# Patient Record
Sex: MALE | Race: WHITE | NOT HISPANIC OR LATINO
[De-identification: names, ages, dates, MRNs, and addresses within clinical notes are randomized per-mention and may not be internally consistent; named-entity substitution may affect disease eponyms.]

---

## 2022-11-10 ENCOUNTER — HOSPITAL ENCOUNTER (OUTPATIENT)
Dept: DATA CONVERSION | Age: 32
Discharge: NURSING, REHAB OR LONG-TERM CARE | End: 2022-11-10
Attending: SURGERY | Admitting: SURGERY

## 2022-11-10 LAB
ABO GROUP (TYPE) IN BLOOD: NORMAL
ANTIBODY SCREEN: NORMAL
ERYTHROCYTE DISTRIBUTION WIDTH (RATIO) BY AUTOMATED COUNT: 12.3 % (ref 11.5–14.5)
ERYTHROCYTE MEAN CORPUSCULAR HEMOGLOBIN CONCENTRATION (G/DL) BY AUTOMATED: 34.4 G/DL (ref 32–36)
ERYTHROCYTE MEAN CORPUSCULAR VOLUME (FL) BY AUTOMATED COUNT: 85 FL (ref 80–100)
ERYTHROCYTES (10*6/UL) IN BLOOD BY AUTOMATED COUNT: 4.35 X10E12/L (ref 4.5–5.9)
ETHANOL (MG/DL) IN SER/PLAS: <10 MG/DL
HEMATOCRIT (%) IN BLOOD BY AUTOMATED COUNT: 36.9 % (ref 41–52)
HEMOGLOBIN (G/DL) IN BLOOD: 12.7 G/DL (ref 13.5–17.5)
LEUKOCYTES (10*3/UL) IN BLOOD BY AUTOMATED COUNT: 7.7 X10E9/L (ref 4.4–11.3)
MAGNESIUM (MG/DL) IN SER/PLAS: 1.83 MG/DL (ref 1.6–2.4)
NRBC (PER 100 WBCS) BY AUTOMATED COUNT: 0 /100 WBC (ref 0–0)
PATIENT TEMPERATURE: 37 DEGREES C
PATIENT TEMPERATURE: 37 DEGREES C
PHOSPHATE (MG/DL) IN SER/PLAS: 2.3 MG/DL (ref 2.5–4.9)
PLATELETS (10*3/UL) IN BLOOD AUTOMATED COUNT: 386 X10E9/L (ref 150–450)
POCT ANION GAP, VENOUS: 11 MMOL/L (ref 10–25)
POCT ANION GAP, VENOUS: 6 MMOL/L (ref 10–25)
POCT BASE EXCESS, VENOUS: -1.1 MMOL/L (ref -2–3)
POCT BASE EXCESS, VENOUS: 1.2 MMOL/L (ref -2–3)
POCT CHLORIDE, VENOUS: 106 MMOL/L (ref 98–107)
POCT CHLORIDE, VENOUS: 107 MMOL/L (ref 98–107)
POCT GLUCOSE, VENOUS: 113 MG/DL (ref 74–99)
POCT GLUCOSE, VENOUS: 87 MG/DL (ref 74–99)
POCT HCO3, VENOUS: 26 MMOL/L (ref 22–26)
POCT HCO3, VENOUS: 26.1 MMOL/L (ref 22–26)
POCT HEMATOCRIT, VENOUS: 39 % (ref 41–52)
POCT HEMATOCRIT, VENOUS: 41 % (ref 41–52)
POCT HEMOGLOBIN, VENOUS: 13 G/DL (ref 13.5–17.5)
POCT HEMOGLOBIN, VENOUS: 13.6 G/DL (ref 13.5–17.5)
POCT IONIZED CALCIUM, VENOUS: 1.21 MMOL/L (ref 1.1–1.33)
POCT IONIZED CALCIUM, VENOUS: 1.24 MMOL/L (ref 1.1–1.33)
POCT LACTATE, VENOUS: 0.9 MMOL/L (ref 0.4–2)
POCT LACTATE, VENOUS: 1.3 MMOL/L (ref 0.4–2)
POCT OXY HEMOGLOBIN, VENOUS: 66.9 % (ref 45–75)
POCT OXY HEMOGLOBIN, VENOUS: 86.1 % (ref 45–75)
POCT PCO2, VENOUS: 41 MMHG (ref 41–51)
POCT PCO2, VENOUS: 53 MMHG (ref 41–51)
POCT PH, VENOUS: 7.3 (ref 7.33–7.43)
POCT PH, VENOUS: 7.41 (ref 7.33–7.43)
POCT PO2, VENOUS: 38 MMHG (ref 35–45)
POCT PO2, VENOUS: 60 MMHG (ref 35–45)
POCT POTASSIUM, VENOUS: 3.7 MMOL/L (ref 3.5–5.3)
POCT POTASSIUM, VENOUS: 3.9 MMOL/L (ref 3.5–5.3)
POCT SO2, VENOUS: 69 % (ref 45–75)
POCT SO2, VENOUS: 89 % (ref 45–75)
POCT SODIUM, VENOUS: 135 MMOL/L (ref 136–145)
POCT SODIUM, VENOUS: 139 MMOL/L (ref 136–145)
RH FACTOR: NORMAL
SARS-COV-2 RESULT: NOT DETECTED

## 2022-11-11 DIAGNOSIS — R11.2 NAUSEA WITH VOMITING, UNSPECIFIED: ICD-10-CM

## 2022-11-11 DIAGNOSIS — S52.502A UNSPECIFIED FRACTURE OF THE LOWER END OF LEFT RADIUS, INITIAL ENCOUNTER FOR CLOSED FRACTURE: ICD-10-CM

## 2022-11-11 DIAGNOSIS — S82.899A OTHER FRACTURE OF UNSPECIFIED LOWER LEG, INITIAL ENCOUNTER FOR CLOSED FRACTURE: ICD-10-CM
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
## 2022-11-11 DIAGNOSIS — Z20.822 CONTACT WITH AND (SUSPECTED) EXPOSURE TO COVID-19: ICD-10-CM

## 2022-11-11 DIAGNOSIS — T07.XXXA UNSPECIFIED MULTIPLE INJURIES, INITIAL ENCOUNTER: ICD-10-CM

## 2022-11-11 DIAGNOSIS — S93.04XA DISLOCATION OF RIGHT ANKLE JOINT, INITIAL ENCOUNTER: ICD-10-CM

## 2022-11-11 DIAGNOSIS — M79.604 PAIN IN RIGHT LEG: ICD-10-CM

## 2022-11-11 DIAGNOSIS — Z88.0 ALLERGY STATUS TO PENICILLIN: ICD-10-CM

## 2022-11-11 DIAGNOSIS — S82.832A OTHER FRACTURE OF UPPER AND LOWER END OF LEFT FIBULA, INITIAL ENCOUNTER FOR CLOSED FRACTURE: ICD-10-CM

## 2022-11-11 DIAGNOSIS — Z04.89 ENCOUNTER FOR EXAMINATION AND OBSERVATION FOR OTHER SPECIFIED REASONS: ICD-10-CM

## 2022-11-11 DIAGNOSIS — Z47.89 ENCOUNTER FOR OTHER ORTHOPEDIC AFTERCARE: ICD-10-CM

## 2022-11-11 DIAGNOSIS — S92.121A DISPLACED FRACTURE OF BODY OF RIGHT TALUS, INITIAL ENCOUNTER FOR CLOSED FRACTURE: ICD-10-CM
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
## 2022-11-11 DIAGNOSIS — R68.89 OTHER GENERAL SYMPTOMS AND SIGNS: ICD-10-CM

## 2022-11-11 DIAGNOSIS — G89.11 ACUTE PAIN DUE TO TRAUMA: ICD-10-CM

## 2022-11-11 DIAGNOSIS — F17.200 NICOTINE DEPENDENCE, UNSPECIFIED, UNCOMPLICATED: ICD-10-CM

## 2022-11-11 DIAGNOSIS — E66.9 OBESITY, UNSPECIFIED: ICD-10-CM

## 2022-11-11 DIAGNOSIS — Z79.899 OTHER LONG TERM (CURRENT) DRUG THERAPY: ICD-10-CM

## 2022-11-11 DIAGNOSIS — M79.602 PAIN IN LEFT ARM: ICD-10-CM

## 2022-11-11 DIAGNOSIS — S92.142A DISPLACED DOME FRACTURE OF LEFT TALUS, INITIAL ENCOUNTER FOR CLOSED FRACTURE: ICD-10-CM
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
## 2022-11-11 DIAGNOSIS — S92.251A DISPLACED FRACTURE OF NAVICULAR (SCAPHOID) OF RIGHT FOOT, INITIAL ENCOUNTER FOR CLOSED FRACTURE: ICD-10-CM

## 2022-11-11 LAB
ACTIVATED PARTIAL THROMBOPLASTIN TIME IN PPP BY COAGULATION ASSAY: 31 SEC (ref 26–39)
ERYTHROCYTE DISTRIBUTION WIDTH (RATIO) BY AUTOMATED COUNT: 12.7 % (ref 11.5–14.5)
ERYTHROCYTE MEAN CORPUSCULAR HEMOGLOBIN CONCENTRATION (G/DL) BY AUTOMATED: 33.2 G/DL (ref 32–36)
ERYTHROCYTE MEAN CORPUSCULAR VOLUME (FL) BY AUTOMATED COUNT: 87 FL (ref 80–100)
ERYTHROCYTES (10*6/UL) IN BLOOD BY AUTOMATED COUNT: 4.12 X10E12/L (ref 4.5–5.9)
HEMATOCRIT (%) IN BLOOD BY AUTOMATED COUNT: 35.8 % (ref 41–52)
HEMOGLOBIN (G/DL) IN BLOOD: 11.9 G/DL (ref 13.5–17.5)
INR IN PPP BY COAGULATION ASSAY: 1.2 (ref 0.9–1.1)
LEUKOCYTES (10*3/UL) IN BLOOD BY AUTOMATED COUNT: 8.7 X10E9/L (ref 4.4–11.3)
MAGNESIUM (MG/DL) IN SER/PLAS: 2.01 MG/DL (ref 1.6–2.4)
NRBC (PER 100 WBCS) BY AUTOMATED COUNT: 0 /100 WBC (ref 0–0)
PLATELETS (10*3/UL) IN BLOOD AUTOMATED COUNT: 314 X10E9/L (ref 150–450)
PROTHROMBIN TIME (PT) IN PPP BY COAGULATION ASSAY: 13.4 SEC (ref 9.8–13.4)

## 2022-11-12 LAB
ERYTHROCYTE DISTRIBUTION WIDTH (RATIO) BY AUTOMATED COUNT: 13 % (ref 11.5–14.5)
ERYTHROCYTE MEAN CORPUSCULAR HEMOGLOBIN CONCENTRATION (G/DL) BY AUTOMATED: 32.9 G/DL (ref 32–36)
ERYTHROCYTE MEAN CORPUSCULAR VOLUME (FL) BY AUTOMATED COUNT: 89 FL (ref 80–100)
ERYTHROCYTES (10*6/UL) IN BLOOD BY AUTOMATED COUNT: 3.71 X10E12/L (ref 4.5–5.9)
HEMATOCRIT (%) IN BLOOD BY AUTOMATED COUNT: 33.1 % (ref 41–52)
HEMOGLOBIN (G/DL) IN BLOOD: 10.9 G/DL (ref 13.5–17.5)
LEUKOCYTES (10*3/UL) IN BLOOD BY AUTOMATED COUNT: 10 X10E9/L (ref 4.4–11.3)
NRBC (PER 100 WBCS) BY AUTOMATED COUNT: 0 /100 WBC (ref 0–0)
PLATELETS (10*3/UL) IN BLOOD AUTOMATED COUNT: 309 X10E9/L (ref 150–450)

## 2022-11-13 LAB
ALBUMIN (G/DL) IN SER/PLAS: 3.4 G/DL (ref 3.4–5)
ALBUMIN (G/DL) IN SER/PLAS: 3.6 G/DL (ref 3.4–5)
ANION GAP IN SER/PLAS: 10 MMOL/L (ref 10–20)
ANION GAP IN SER/PLAS: 12 MMOL/L (ref 10–20)
ANION GAP IN SER/PLAS: 14 MMOL/L (ref 10–20)
CALCIUM (MG/DL) IN SER/PLAS: 8.4 MG/DL (ref 8.6–10.6)
CALCIUM (MG/DL) IN SER/PLAS: 8.7 MG/DL (ref 8.6–10.6)
CALCIUM (MG/DL) IN SER/PLAS: 9.4 MG/DL (ref 8.6–10.6)
CARBON DIOXIDE, TOTAL (MMOL/L) IN SER/PLAS: 25 MMOL/L (ref 21–32)
CARBON DIOXIDE, TOTAL (MMOL/L) IN SER/PLAS: 26 MMOL/L (ref 21–32)
CARBON DIOXIDE, TOTAL (MMOL/L) IN SER/PLAS: 27 MMOL/L (ref 21–32)
CHLORIDE (MMOL/L) IN SER/PLAS: 104 MMOL/L (ref 98–107)
CHLORIDE (MMOL/L) IN SER/PLAS: 106 MMOL/L (ref 98–107)
CHLORIDE (MMOL/L) IN SER/PLAS: 107 MMOL/L (ref 98–107)
CREATININE (MG/DL) IN SER/PLAS: 0.86 MG/DL (ref 0.5–1.3)
CREATININE (MG/DL) IN SER/PLAS: 0.97 MG/DL (ref 0.5–1.3)
CREATININE (MG/DL) IN SER/PLAS: 1.21 MG/DL (ref 0.5–1.3)
ERYTHROCYTE DISTRIBUTION WIDTH (RATIO) BY AUTOMATED COUNT: 12.8 % (ref 11.5–14.5)
ERYTHROCYTE MEAN CORPUSCULAR HEMOGLOBIN CONCENTRATION (G/DL) BY AUTOMATED: 32.5 G/DL (ref 32–36)
ERYTHROCYTE MEAN CORPUSCULAR VOLUME (FL) BY AUTOMATED COUNT: 89 FL (ref 80–100)
ERYTHROCYTES (10*6/UL) IN BLOOD BY AUTOMATED COUNT: 3.99 X10E12/L (ref 4.5–5.9)
GFR MALE: 81 ML/MIN/1.73M2
GFR MALE: >90 ML/MIN/1.73M2
GFR MALE: >90 ML/MIN/1.73M2
GLUCOSE (MG/DL) IN SER/PLAS: 140 MG/DL (ref 74–99)
GLUCOSE (MG/DL) IN SER/PLAS: 88 MG/DL (ref 74–99)
GLUCOSE (MG/DL) IN SER/PLAS: 94 MG/DL (ref 74–99)
HEMATOCRIT (%) IN BLOOD BY AUTOMATED COUNT: 35.7 % (ref 41–52)
HEMOGLOBIN (G/DL) IN BLOOD: 11.6 G/DL (ref 13.5–17.5)
LEUKOCYTES (10*3/UL) IN BLOOD BY AUTOMATED COUNT: 9.1 X10E9/L (ref 4.4–11.3)
NRBC (PER 100 WBCS) BY AUTOMATED COUNT: 0 /100 WBC (ref 0–0)
PHOSPHATE (MG/DL) IN SER/PLAS: 2.5 MG/DL (ref 2.5–4.9)
PHOSPHATE (MG/DL) IN SER/PLAS: 2.5 MG/DL (ref 2.5–4.9)
PLATELETS (10*3/UL) IN BLOOD AUTOMATED COUNT: 334 X10E9/L (ref 150–450)
POTASSIUM (MMOL/L) IN SER/PLAS: 3.8 MMOL/L (ref 3.5–5.3)
POTASSIUM (MMOL/L) IN SER/PLAS: 3.8 MMOL/L (ref 3.5–5.3)
POTASSIUM (MMOL/L) IN SER/PLAS: 4 MMOL/L (ref 3.5–5.3)
SODIUM (MMOL/L) IN SER/PLAS: 139 MMOL/L (ref 136–145)
SODIUM (MMOL/L) IN SER/PLAS: 140 MMOL/L (ref 136–145)
SODIUM (MMOL/L) IN SER/PLAS: 140 MMOL/L (ref 136–145)
UREA NITROGEN (MG/DL) IN SER/PLAS: 12 MG/DL (ref 6–23)
UREA NITROGEN (MG/DL) IN SER/PLAS: 15 MG/DL (ref 6–23)
UREA NITROGEN (MG/DL) IN SER/PLAS: 9 MG/DL (ref 6–23)

## 2022-11-15 LAB
ALBUMIN (G/DL) IN SER/PLAS: 4.1 G/DL (ref 3.4–5)
ANION GAP IN SER/PLAS: 14 MMOL/L (ref 10–20)
CALCIUM (MG/DL) IN SER/PLAS: 9.6 MG/DL (ref 8.6–10.6)
CARBON DIOXIDE, TOTAL (MMOL/L) IN SER/PLAS: 25 MMOL/L (ref 21–32)
CHLORIDE (MMOL/L) IN SER/PLAS: 102 MMOL/L (ref 98–107)
CREATININE (MG/DL) IN SER/PLAS: 0.84 MG/DL (ref 0.5–1.3)
ERYTHROCYTE DISTRIBUTION WIDTH (RATIO) BY AUTOMATED COUNT: 12.5 % (ref 11.5–14.5)
ERYTHROCYTE MEAN CORPUSCULAR HEMOGLOBIN CONCENTRATION (G/DL) BY AUTOMATED: 33.1 G/DL (ref 32–36)
ERYTHROCYTE MEAN CORPUSCULAR VOLUME (FL) BY AUTOMATED COUNT: 87 FL (ref 80–100)
ERYTHROCYTES (10*6/UL) IN BLOOD BY AUTOMATED COUNT: 4.6 X10E12/L (ref 4.5–5.9)
GFR MALE: >90 ML/MIN/1.73M2
GLUCOSE (MG/DL) IN SER/PLAS: 110 MG/DL (ref 74–99)
HEMATOCRIT (%) IN BLOOD BY AUTOMATED COUNT: 39.9 % (ref 41–52)
HEMOGLOBIN (G/DL) IN BLOOD: 13.2 G/DL (ref 13.5–17.5)
LEUKOCYTES (10*3/UL) IN BLOOD BY AUTOMATED COUNT: 9.2 X10E9/L (ref 4.4–11.3)
MAGNESIUM (MG/DL) IN SER/PLAS: 2.1 MG/DL (ref 1.6–2.4)
NRBC (PER 100 WBCS) BY AUTOMATED COUNT: 0 /100 WBC (ref 0–0)
PHOSPHATE (MG/DL) IN SER/PLAS: 3.6 MG/DL (ref 2.5–4.9)
PLATELETS (10*3/UL) IN BLOOD AUTOMATED COUNT: 431 X10E9/L (ref 150–450)
POTASSIUM (MMOL/L) IN SER/PLAS: 4.2 MMOL/L (ref 3.5–5.3)
SODIUM (MMOL/L) IN SER/PLAS: 137 MMOL/L (ref 136–145)
UREA NITROGEN (MG/DL) IN SER/PLAS: 15 MG/DL (ref 6–23)

## 2022-11-16 LAB
ALANINE AMINOTRANSFERASE (SGPT) (U/L) IN SER/PLAS: 44 U/L (ref 10–52)
ALBUMIN (G/DL) IN SER/PLAS: 3.5 G/DL (ref 3.4–5)
ALBUMIN (G/DL) IN SER/PLAS: 3.5 G/DL (ref 3.4–5)
ALKALINE PHOSPHATASE (U/L) IN SER/PLAS: 46 U/L (ref 33–120)
ANION GAP IN SER/PLAS: 17 MMOL/L (ref 10–20)
ASPARTATE AMINOTRANSFERASE (SGOT) (U/L) IN SER/PLAS: 43 U/L (ref 9–39)
BILIRUBIN DIRECT (MG/DL) IN SER/PLAS: 0.1 MG/DL (ref 0–0.3)
BILIRUBIN TOTAL (MG/DL) IN SER/PLAS: 0.6 MG/DL (ref 0–1.2)
CALCIUM (MG/DL) IN SER/PLAS: 9.1 MG/DL (ref 8.6–10.6)
CARBON DIOXIDE, TOTAL (MMOL/L) IN SER/PLAS: 24 MMOL/L (ref 21–32)
CHLORIDE (MMOL/L) IN SER/PLAS: 102 MMOL/L (ref 98–107)
CREATININE (MG/DL) IN SER/PLAS: 0.79 MG/DL (ref 0.5–1.3)
ERYTHROCYTE DISTRIBUTION WIDTH (RATIO) BY AUTOMATED COUNT: 12.5 % (ref 11.5–14.5)
ERYTHROCYTE MEAN CORPUSCULAR HEMOGLOBIN CONCENTRATION (G/DL) BY AUTOMATED: 33.3 G/DL (ref 32–36)
ERYTHROCYTE MEAN CORPUSCULAR VOLUME (FL) BY AUTOMATED COUNT: 87 FL (ref 80–100)
ERYTHROCYTES (10*6/UL) IN BLOOD BY AUTOMATED COUNT: 4.62 X10E12/L (ref 4.5–5.9)
GFR MALE: >90 ML/MIN/1.73M2
GLUCOSE (MG/DL) IN SER/PLAS: 79 MG/DL (ref 74–99)
HEMATOCRIT (%) IN BLOOD BY AUTOMATED COUNT: 40.3 % (ref 41–52)
HEMOGLOBIN (G/DL) IN BLOOD: 13.4 G/DL (ref 13.5–17.5)
LEUKOCYTES (10*3/UL) IN BLOOD BY AUTOMATED COUNT: 10.7 X10E9/L (ref 4.4–11.3)
MAGNESIUM (MG/DL) IN SER/PLAS: 1.83 MG/DL (ref 1.6–2.4)
NRBC (PER 100 WBCS) BY AUTOMATED COUNT: 0 /100 WBC (ref 0–0)
PHOSPHATE (MG/DL) IN SER/PLAS: 3.5 MG/DL (ref 2.5–4.9)
PLATELETS (10*3/UL) IN BLOOD AUTOMATED COUNT: 408 X10E9/L (ref 150–450)
POTASSIUM (MMOL/L) IN SER/PLAS: 4.3 MMOL/L (ref 3.5–5.3)
PROTEIN TOTAL: 6.3 G/DL (ref 6.4–8.2)
SODIUM (MMOL/L) IN SER/PLAS: 139 MMOL/L (ref 136–145)
UREA NITROGEN (MG/DL) IN SER/PLAS: 13 MG/DL (ref 6–23)

## 2022-11-17 LAB
ALBUMIN (G/DL) IN SER/PLAS: 3.8 G/DL (ref 3.4–5)
ANION GAP IN SER/PLAS: 15 MMOL/L (ref 10–20)
CALCIUM (MG/DL) IN SER/PLAS: 9.7 MG/DL (ref 8.6–10.6)
CARBON DIOXIDE, TOTAL (MMOL/L) IN SER/PLAS: 25 MMOL/L (ref 21–32)
CHLORIDE (MMOL/L) IN SER/PLAS: 104 MMOL/L (ref 98–107)
CREATININE (MG/DL) IN SER/PLAS: 0.88 MG/DL (ref 0.5–1.3)
ERYTHROCYTE DISTRIBUTION WIDTH (RATIO) BY AUTOMATED COUNT: 12.6 % (ref 11.5–14.5)
ERYTHROCYTE MEAN CORPUSCULAR HEMOGLOBIN CONCENTRATION (G/DL) BY AUTOMATED: 33.9 G/DL (ref 32–36)
ERYTHROCYTE MEAN CORPUSCULAR VOLUME (FL) BY AUTOMATED COUNT: 86 FL (ref 80–100)
ERYTHROCYTES (10*6/UL) IN BLOOD BY AUTOMATED COUNT: 4.47 X10E12/L (ref 4.5–5.9)
GFR MALE: >90 ML/MIN/1.73M2
GLUCOSE (MG/DL) IN SER/PLAS: 86 MG/DL (ref 74–99)
HEMATOCRIT (%) IN BLOOD BY AUTOMATED COUNT: 38.4 % (ref 41–52)
HEMOGLOBIN (G/DL) IN BLOOD: 13 G/DL (ref 13.5–17.5)
LEUKOCYTES (10*3/UL) IN BLOOD BY AUTOMATED COUNT: 9.1 X10E9/L (ref 4.4–11.3)
MAGNESIUM (MG/DL) IN SER/PLAS: 2.11 MG/DL (ref 1.6–2.4)
NRBC (PER 100 WBCS) BY AUTOMATED COUNT: 0 /100 WBC (ref 0–0)
PHOSPHATE (MG/DL) IN SER/PLAS: 4.2 MG/DL (ref 2.5–4.9)
PLATELETS (10*3/UL) IN BLOOD AUTOMATED COUNT: 385 X10E9/L (ref 150–450)
POTASSIUM (MMOL/L) IN SER/PLAS: 4.7 MMOL/L (ref 3.5–5.3)
SODIUM (MMOL/L) IN SER/PLAS: 139 MMOL/L (ref 136–145)
UREA NITROGEN (MG/DL) IN SER/PLAS: 17 MG/DL (ref 6–23)

## 2022-11-18 LAB — SARS-COV-2 RESULT: NOT DETECTED

## 2023-02-28 LAB
ATRIAL RATE: 67 BPM
P AXIS: 56 DEGREES
P OFFSET: 188 MS
P ONSET: 132 MS
PR INTERVAL: 172 MS
Q ONSET: 218 MS
QRS COUNT: 11 BEATS
QRS DURATION: 110 MS
QT INTERVAL: 408 MS
QTC CALCULATION(BAZETT): 431 MS
QTC FREDERICIA: 423 MS
R AXIS: 77 DEGREES
T AXIS: 8 DEGREES
T OFFSET: 422 MS
VENTRICULAR RATE: 67 BPM

## 2023-03-01 NOTE — PROGRESS NOTES
Service: Trauma Surgery     Subjective Data:   LINDA TOSCANO is a 32 year old Male who is Hospital Day # 6.     Patient with ongoing nausea and 750cc emesis overnight with RUQ pain. Reports having two bowel movements and urinating appropriately.    Objective Data:     Objective Information:      T   P  R  BP   MAP  SpO2   Value  36.6  57  18  161/93   110  97%  Date/Time 11/15 13:00 11/15 13:00 11/15 13:00 11/15 13:00  11/15 9:31 11/15 13:00  Range  (36C - 37.1C )  (55 - 72 )  (16 - 20 )  (112 - 164 )/ (75 - 93 )  (110 - 110 )  (94% - 98% )  Highest temp of 37.1 C was recorded at 11/15 4:54      Pain reported at 11/15 9:27: 8 = Severe    ---- Intake and Output  -----  Mn/Dy/Year Time  Intake   Output  Net  Nov 15, 2022 2:00 pm  1040   800  240  Nov 15, 2022 6:00 am  0   600  -600  Nov 14, 2022 10:00 pm  0   1575  -1575    The Intake and Output Totals for the last 24 hours are:      Intake   Output  Net      null   3275  null    Physical Exam by System:    Constitutional: NAD, awake and alert   Eyes: EOMI, non-icteric   Head/Neck: Normocephalic, atraumatic   Respiratory/Thorax: Unlabored breathing on room air   Cardiovascular: Regular rate   Gastrointestinal: Soft, nondistended, TTP in RUQ  & LUQ   Genitourinary: Voiding appropriately   Musculoskeletal: LUE, LLE, RLE immobilized and wrapped  in aces   Neurological: A & O x 3, grossly intact   Psychological: Appropriate mood and affect   Skin: Warm and dry, no lesions or rashes     Recent Lab Results:    Results:    CBC: 11/15/2022 09:10              \     Hgb     /                              \     Canceled       /  WBC  ----------------  Plt               Canceled       ----------------    Canceled              /     Hct     \                              /     Canceled       \            RBC: Canceled     MCV: Canceled     Neutrophil %: Canceled      RFP: 11/15/2022 07:45  NA+        Cl-     BUN  /                         137    102    15   /  --------------------------------  Glucose                ---------------------------  110 H    K+     HCO3-   Creat \                         4.2    25    0.84  \  Calcium : 9.6Anion Gap : 14          Albumin : 4.1     Phos : 3.6        I have reviewed these laboratory results:    Complete Blood Count  15-Nov-2022 07:45:00      Result Value    White Blood Cell Count  9.2    Nucleated Erythrocyte Count  0.0    Red Blood Cell Count  4.60    HGB  13.2   L   HCT  39.9   L   MCV  87    MCHC  33.1    PLT  431    RDW-CV  12.5      Renal Function Panel  15-Nov-2022 07:45:00      Result Value    Glucose, Serum  110   H   NA  137    K  4.2    CL  102    Bicarbonate, Serum  25    Anion Gap, Serum  14    BUN  15    CREAT  0.84    GFR Male  >90    Calcium, Serum  9.6    Phosphorus, Serum  3.6    ALB  4.1        Assessment and Plan:   Code Status:  ·  Code Status Full Code     Assessment:    Tobias Paulino is a 25 year old male who fell 25 feet through a roof with a left radial fracture, right subtalar/talonavicular subluxation/dislocation, right intra-articular posterior  talus fracture, and right oblique fracture through lateral malleolus. Patient with nausea and vomiting overnight. RUQ US with no signs of acute cholecystitis    Plan:    Neuo: acute pain  - Tylenol 650 mg Q4HR  - Robaxin 500 mg Q6HR  - Oxy 5/10 Q4HR PRN  - Gabapentin 300mg Q8HR    Cardio  - Vitals Qshift    Respiratory  - IS  - SpO2 >92%    GI  - NPO - > advanced to CLD   - Zofran and Phenergan PRN  - Bowel Regimen - senna, colace      - Monitor I/Os  - IVFs    Musculoskeletal  -NWB b/l LE, and BO anticipate SNF placement  -ex fix to RLE, awaiting orthopedics plans for removal    Heme  - Lovenox 30mg Q12HR    Dispo pending SNF placement - requesting [facility]    Patient seen and discussed with Dr. Alex Webb MD  PGY 1 General Surgery  Trauma Surgery #[id number]      Attestation:   Note Completion:  I am a:  Resident/Fellow   Attending  Attestation I saw and evaluated the patient.  I personally obtained the key and critical portions of the history and physical exam or was physically present for key and  critical portions performed by the resident/fellow. I reviewed the resident/fellow?s documentation and discussed the patient with the resident/fellow.  I agree with the resident/fellow?s medical decision making as documented in the note.     I personally evaluated the patient on 15-Nov-2022   Comments/ Additional Findings    Patient remains in stable condition although is struggling with PO intolerance and RUQ abdominal pain. Ultrasound shows no stones or evidence of  acute inflammation, however the patient has been told in the past that he has gallstones with a prior episode of biliary colic. May need to proceed with HIDA if feasible. Continue symptomatic treatment for now.           Electronic Signatures:  Bacilio Florence (Resident))  (Signed 15-Nov-2022 17:39)   Authored: Service, Subjective Data, Objective Data, Assessment  and Plan, Note Completion  Pennie Johnson)  (Signed 18-Nov-2022 09:18)   Authored: Note Completion   Co-Signer: Service, Subjective Data, Objective Data, Assessment and Plan, Note Completion      Last Updated: 18-Nov-2022 09:18 by Pennie Johnson)

## 2023-03-01 NOTE — PROGRESS NOTES
Service: Orthopaedics     Subjective Data:   LINDA TOSCANO is a 32 year old Male who is Hospital Day # 4.     Patient seen and examined at bedside. Pain well controlled, no complaints at this time. Understands NWB LLE, RLE, and LUE restrictions. Denies fever, chills, CP, SOB.    Objective Data:     Objective Information:      T   P  R  BP   MAP  SpO2   Value  36.8  68  18  132/71      97%  Date/Time 11/13 11:48 11/13 11:48 11/13 11:48 11/13 11:48    11/13 11:48  Range  (36C - 36.9C )  (68 - 82 )  (18 - 20 )  (125 - 166 )/ (71 - 91 )    (92% - 99% )  Highest temp of 36.9 C was recorded at 11/12 19:02      Pain reported at 11/13 8:04: 8 = Severe    ---- Intake and Output  -----  Mn/Dy/Year Time  Intake   Output  Net  Nov 12, 2022 2:00 pm  0   600  -600    The Intake and Output Totals for the last 24 hours are:      Intake   Output  Net      null   600  null    Physical Exam Narrative:  ·  Physical Exam:    - Constitutional: No acute distress, cooperative  - Eyes: EOM grossly intact  - Head/Neck: Trachea midline  - Respiratory/Thorax: NWOB  - Cardiovascular: RRR on peripheral palpation  - Gastrointestinal: Nondistended  - Psychological: Appropriate mood/behavior  - Skin: Warm and dry. Additional findings in musculoskeletal evaluation  - Musculoskeletal:  ---  LUE  - volar slab splint and dressings c/d/i  - Gross motor function of the AIN, PIN, radial, ulnar and median nerves intact  - SILT C5-T1  - compartments are soft and compressible    RLE  - AS external fixator intact, pin sites c/d  - SILT L2-S1  - wiggles toes, 3/5 EHL, FHL  - compartments soft and compressible    LLE  - Bulky Erazo splint c/d/i on LLE  - SILT L2-S1  - wiggles toes, 3/5 with EHL, FHL  - compartments are soft and compressible, no pain with passive f/e of toes    Recent Lab Results:    Results:    CBC: 11/13/2022 07:27              \     Hgb     /                              \     11.6 L    /  WBC  ----------------  Plt               9.1        ----------------    334              /     Hct     \                              /     35.7 L    \            RBC: 3.99 L    MCV: 89           RFP: 11/13/2022 07:27  NA+        Cl-     BUN  /                         139    106    9  /  --------------------------------  Glucose                ---------------------------  94    K+     HCO3-   Creat \                         4.0    25    0.86  \  Calcium : 8.7Anion Gap : 12          Albumin : 3.4     Phos : 2.5      Assessment and Plan:   Daily Risk Screen:  ·  Does patient have an indwelling urinary catheter? yes   ·  Plan for indwelling urinary catheter removal today? no   ·  The patient continues to require indwelling urinary catheterization for perioperative use for selected surgical procedures   ·  Does patient have a central line? n/a consulting service     Code Status:  ·  Code Status Full Code     Assessment:    Orthopedic Assessment  1. Closed L distal radius fracture, s/p ORIF L distal radius 11/11  2. Closed L talus lateral process fracture, s/p ORIF L talus on 11/11  3. Closed R talar body, R navicular, R cuboid fractures with subtalar and talonavicular joint dislocation, s/p closed reduction, s/p application of AS ex fix on 11/11      Orthopedic Plan of Care  - WB: NWB LUE, NWB LLE, NWB RLE  - Diet: regular ok per ortho  - VTE ppx: ok to restart per ortho, choice of chemical ppx per primary  - Pain control: per primary team  - Post-op abx: ancef 2g q8h x3  - PT OT  - Ice and elevate  - Maintain splints and dressings     Dispo: Orthopedics to sign off. Follow up with Dr. Longo in clinic at 2 weeks post-op    D/w attending physician Dr Donta Camp DO, PGY3    Please contact ortho pager [phone]) after 6 pm or on weekends    Ortho Trauma Service: (all available via [employer])  1st call: Carmelo Steinberg MD, PGY-1 ([phone])  2nd call: Bob Cisse MD, PGY3 ([phone])      Attestation:   Note Completion:  I am a:  Resident/Fellow   Attending  Attestation I reviewed the resident/fellow?s documentation and discussed the patient with the resident/fellow.  I agree with the resident/fellow?s medical  decision making as documented in the note.         Electronic Signatures:  Mukul Camp)  (Signed 13-Nov-2022 12:44)   Authored: Service, Subjective Data, Objective Data, Assessment  and Plan, Note Completion  Silas Longo)  (Signed 13-Nov-2022 16:47)   Co-Signer: Service, Subjective Data, Objective Data,  Assessment and Plan, Note Completion      Last Updated: 13-Nov-2022 16:47 by Silas Longo)

## 2023-03-01 NOTE — PROGRESS NOTES
Service: Orthopaedics     Subjective Data:   Patient is Hospital Day # 3.     Patient seen and examined at bedside. No acute events overnight. Resting comfortably, on room air. A/ox3 and cooperative with exam. Dressings all c/d/i, pain well controlled, tolerating diet, urinating  and passing gas. No complaints at this time. Denies fever, chills, CP, SOB.    Objective Data:     Objective Information:      T   P  R  BP   MAP  SpO2   Value  36.5  78  18  157/91   83  98%  Date/Time 11/12 8:27 11/12 8:27 11/12 8:27 11/12 8:27 11/11 4:49 11/12 8:27  Range  (36.5C - 36.7C )  (72 - 109 )  (18 - 20 )  (119 - 166 )/ (65 - 91 )  (78 - 106 )  (92% - 99% )      Pain reported at 11/12 9:52: 10 = Severe    ---- Intake and Output  -----  Mn/Dy/Year Time  Intake   Output  Net  Nov 12, 2022 6:00 am  0   1350  -1350  Nov 11, 2022 10:00 pm  800   445  355  Nov 11, 2022 2:00 pm  1200   700  500    The Intake and Output Totals for the last 24 hours are:      Intake   Output  Net      2000   7159  -731    Physical Exam Narrative:  ·  Physical Exam:    - Constitutional: No acute distress, cooperative  - Eyes: EOM grossly intact  - Head/Neck: Trachea midline  - Respiratory/Thorax: NWOB  - Cardiovascular: RRR on peripheral palpation  - Gastrointestinal: Nondistended  - Psychological: Appropriate mood/behavior  - Skin: Warm and dry. Additional findings in musculoskeletal evaluation  - Musculoskeletal:  ---  LUE  - volar slab splint and dressings c/d/i  - Gross motor function of the AIN, PIN, radial, ulnar and median nerves intact  - SILT C5-T1  - compartments are soft and compressible    RLE  - AS external fixator intact, pin sites c/d  - SILT L2-S1  - wiggles toes, 3/5 EHL, FHL  - compartments soft and compressible    LLE  - Bulky Erazo splint c/d/i on LLE  - SILT L2-S1  - wiggles toes, 3/5 with EHL, FHL  - compartments are soft and compressible, no pain with passive f/e of toes    Recent Lab Results:    Results:    CBC: 11/12/2022  06:13              \     Hgb     /                              \     10.9 L    /  WBC  ----------------  Plt               10.0       ----------------    309              /     Hct     \                              /     33.1 L    \            RBC: 3.71 L    MCV: 89           RFP: 11/11/2022 14:53  NA+        Cl-     BUN  /                         140    104    12  /  --------------------------------  Glucose                ---------------------------  140 H    K+     HCO3-   Creat \                         3.8    26    0.97  \  Calcium : 8.4 LAnion Gap : 14          Albumin : 3.6     Phos : 2.5      Assessment and Plan:   Daily Risk Screen:  ·  Does patient have an indwelling urinary catheter? yes   ·  Plan for indwelling urinary catheter removal today? no    ·  The patient continues to require indwelling urinary catheterization for perioperative use for selected surgical procedures   ·  Does patient have a central line? n/a consulting service     Code Status:  ·  Code Status Full Code     Assessment:    Orthopedic Assessment  1. Closed L distal radius fracture, s/p ORIF L distal radius 11/11  2. Closed L talus lateral process fracture, s/p ORIF L talus on 11/11  3. Closed R talar body, R navicular, R cuboid fractures with subtalar and talonavicular joint dislocation, s/p closed reduction, s/p application of AS ex fix on 11/11      Orthopedic Plan of Care  - WB: NWB LUE, NWB LLE, NWB RLE  - Diet: regular ok per ortho  - VTE ppx: ok to restart per ortho, choice of chemical ppx per primary  - Pain control: per primary team  - Post-op abx: ancef 2g q8h x3  - PT OT  - Ice and elevate  - Maintain splints and dressings     Dispo: no further surgical intervention anticipated during current stay. Ex fix removal at 6-8 weeks post-op. Orthopedics to continue to follow, likely will require SNF placement 2/2 multiple [facility] extremities.     D/w attending physician Dr Donta Camp DO, PGY3    Please contact  ortho pager [phone]) after 6 pm or on weekends    Ortho Trauma Service: (all available via [employer])  1st call: Carmelo Steinberg MD, PGY-1 ([phone])  2nd call: Bob Cisse MD, PGY3 ([phone])      Attestation:   Note Completion:  I am a:  Resident/Fellow   Attending Attestation I reviewed the resident/fellow?s documentation and discussed the patient with the resident/fellow.  I agree with the resident/fellow?s medical  decision making as documented in the note.         Electronic Signatures:  Mukul Camp)  (Signed 12-Nov-2022 12:38)   Authored: Service, Subjective Data, Objective Data, Assessment  and Plan, Note Completion  Silas Longo)  (Signed 12-Nov-2022 14:23)   Authored: Assessment and Plan   Co-Signer: Service, Subjective Data, Objective Data, Assessment and Plan, Note Completion      Last Updated: 12-Nov-2022 14:23 by Silas Longo)

## 2023-03-01 NOTE — PROGRESS NOTES
Service: Trauma Surgery     Subjective Data:   Patient is Hospital Day # 3.     Pt tolerated OR well. Has complaints of burning pain this morning. Krueger pulled at 0400.    Objective Data:     Objective Information:        T   P  R  BP   MAP  SpO2   Value  36.7  81  20  166/82   83  92%  Date/Time 11/12 4:02 11/12 4:02 11/12 4:02 11/12 4:02  11/11 4:49 11/12 4:02  Range  (36.7C - 36.7C )  (72 - 109 )  (20 - 20 )  (119 - 166 )/ (65 - 90 )  (78 - 106 )  (92% - 99% )         Weights   11/11 17:52: Weight in kg (Weight (kg))  136  11/11 17:52: Weight in lbs ((lbs))  299.8  11/11 17:52: BMI (kg/m2) (BMI (kg/m2))  34.273        Pain reported at 11/11 20:00: 8 = Severe      ---- Intake and Output  -----  Mn/Dy/Year Time  Intake   Output  Net  Nov 12, 2022 6:00 am  0   1350  -1350  Nov 11, 2022 10:00 pm  800   445  355  Nov 11, 2022 2:00 pm  1200   700  500    The Intake and Output Totals for the last 24 hours are:      Intake   Output  Net      2000   5953  -649    ---Intake---   IV Fluids      Procedure IV In: 1200 mL      Infusion: 800 mL      Infusion: 800 mL      Infusion: 800 mL    ---Output---  Urine      Procedure Urine Out: 325 mL      Indwelling Catheter - Urethral: 2120 mL  Blood      Procedure Blood Loss: 50 mL    Physical Exam by System:    Constitutional: well appearing young male in NAD   Eyes: PERRL, EOMI   ENMT: MMM   Head/Neck: NC. AT.   Respiratory/Thorax: CTAB. diminished at bases.   Cardiovascular: RRR, no mgr.   Gastrointestinal: abd s/nt/bd   Genitourinary: deferred   Musculoskeletal: splint to LUE, LLE. ex fix to RLE.  able to wiggle fingers and toes   Extremities: no cyanosis. see MSK.   Neurological: a and ox3, gcs 15   Psychological: appropriate mood and behavior.   Skin: warm and dry     Medication:    Medications:      ANTI-INFECTIVES:    1. ceFAZolin IV Piggy Back:  2  gram(s)  IntraVenous Piggyback  Every 8 Hours      CENTRAL NERVOUS SYSTEM AGENTS:    1. Acetaminophen:  650  mg  Oral   Every 4 Hours    2. oxyCODONE Immediate Release:  5  mg  Oral  Every 4 Hours   PRN       3. oxyCODONE Immediate Release:  10  mg  Oral  Every 4 Hours   PRN       4. oxyCODONE Immediate Release:  5  mg  Oral  Every 4 Hours   PRN       5. Gabapentin:  300  mg  Oral  3 Times a Day    6. Methocarbamol:  500  mg  Oral  Every 6 Hours      COAGULATION MODIFIERS:    1. Enoxaparin SubCutaneous:  30  mg  SubCutaneous  Every 12 Hours      GASTROINTESTINAL AGENTS:    1. Docusate:  100  mg  Oral  2 Times a Day    2. Sennosides:  2  tablet(s)  Oral  At Bedtime      NUTRITIONAL PRODUCTS:    1. Lactated Ringers Infusion:  1000  mL  IntraVenous  <Continuous>      Recent Lab Results:    Results:        I have reviewed these laboratory results:    Complete Blood Count  Trending View      Result 12-Nov-2022 06:13:00  11-Nov-2022 14:53:00    White Blood Cell Count 10.0   8.7    Nucleated Erythrocyte Count 0.0   0.0    Red Blood Cell Count 3.71   L   4.12   L    HGB 10.9   L   11.9   L    HCT 33.1   L   35.8   L    MCV 89   87    MCHC 32.9   33.2       314    RDW-CV 13.0   12.7        Renal Function Panel  11-Nov-2022 14:53:00      Result Value    Glucose, Serum  140   H   NA  140    K  3.8    CL  104    Bicarbonate, Serum  26    Anion Gap, Serum  14    BUN  12    CREAT  0.97    GFR Male  60    Calcium, Serum  8.4   L   Phosphorus, Serum  2.5    ALB  3.6      Magnesium, Serum  11-Nov-2022 14:53:00      Result Value    Magnesium, Serum  2.01      Coagulation Screen  11-Nov-2022 05:00:00      Result Value    Prothrombin Time, Plasma  13.4    International Normalized Ratio, Plasma  1.2   H   Activated Partial Thromboplastin Time  31      Coronavirus 2019, Screen Asymptomatic  10-Nov-2022 18:40:27      Result Value    Fluid Source  Nasal, Nasopharyngeal      Venous Full Panel  Trending View      Result 10-Nov-2022 15:34:00  10-Nov-2022 12:22:00    pH, Venous 7.30   L   7.41    pCO2, Venous 53   H   41    pO2, Venous 60   H   38     SO2, Venous 89   H   69    Bicarbonate, Calculated, Venous 26.1   H   26.0    HGB, Venous 13.6   13.0   L    Anion Gap Level, Venous 6   L   11    Base Excess, Venous -1.1   1.2    Calcium, Ionized Venous 1.24   1.21    Chloride Level 107   106    Glucose Level, Venous 113   H   87    HCT CALCULATED, Venous 41.0   39.0   L    Lactate Level, Venous 0.9   1.3    OXY HGB, Venous 86.1   H   66.9    Patient Temperature, Venous 37.0   37.0    Potassium Level, Venous 3.7   3.9    Sodium Level, Venous 135   L   139          Assessment and Plan:   Daily Risk Screen:  ·  Does patient have an indwelling urinary catheter? yes   ·  Plan for indwelling urinary catheter removal today? yes          Admitting Dx:   Fall on/from ladder, initial encounter: Entered Date: 10-Nov-2022  14:06       Additional Dx:   Ankle fracture: Entered Date: 11-Nov-2022 14:06   Body mass index [BMI] 37.0-37.9, adult: Entered Date: 11-Nov-2022  06:34    Code Status:  ·  Code Status Full Code     Assessment:    25 year old male fell 25 feet after fall, intubated in trauma bay for reduction of R ankle dislocation, aspirated in CT scanner    List of Clinically Significant Injuries/Issues:   -L radial fx  -R subtalar/talonavicular subluxation/dislocation.  -R Intra-articular posterior talus fx  -R Oblique fracture through the lateral malleolus    Plan:  -NWB b/l LE, and LUE anticipate SNF placement  -ex fix to RLE, awaiting orthopedics plans for removal  -ancef x24h  -arora out, f/u void this morning.   -tylenol, oxy 5/10, oxy 5 BT, robaxin, gabapentin  -lovenox  -senna, colace  -PT/OT --> anticipate SNF    20 minutes spent with patient reviewing VSS/medications, obtaining subjective information, performing physical exam, discussing plan of care;  with greater than 50% of that time spent in patient room.     Patient discussed with Dr. Keane.     Jose Sol PA-C  Trauma Surgery  [id number]        Electronic Signatures:  Jose Sol (PAC)  (Signed  12-Nov-2022 08:22)   Authored: Service, Subjective Data, Objective Data, Assessment  and Plan, Note Completion      Last Updated: 12-Nov-2022 08:22 by Jose Sol (PAC)

## 2023-03-01 NOTE — PROGRESS NOTES
"      Service: Trauma Surgery     Subjective Data:   LINDA TOSCANO is a 32 year old Male who is Hospital Day # 9.     NAEO. Pleasant mood. Reports he went to the cafeteria yesterday for some sushi and a code brown was called. States it was \"worth it.\" Denies any fever, chills, n/v, change in BM.    Objective Data:     Objective Information:      T   P  R  BP   MAP  SpO2   Value  35.7  68  18  143/81      99%  Date/Time 11/18 3:55 11/18 3:55 11/18 3:55 11/18 3:55    11/18 3:55  Range  (35.5C - 36.3C )  (57 - 102 )  (18 - 18 )  (130 - 152 )/ (62 - 91 )    (96% - 100% )      Pain reported at 11/18 1:27: 6 = Moderate    ---- Intake and Output  -----  Mn/Dy/Year Time  Intake   Output  Net  Nov 18, 2022 6:00 am  0   0  0  Nov 17, 2022 2:00 pm  0   950  -950    The Intake and Output Totals for the last 24 hours are:      Intake   Output  Net      null   950  null    Physical Exam Narrative:  ·  Physical Exam:    Constitutional: No acute distress  Neurological: Awake, alert, conversive  Cardiovascular: no acute issues  Head/Neck: NCAT  Respiratory/Thorax: even, unlabored, breathing regularly on RA  Gastrointestinal: soft nontender, nondistended  Skin: warm, dry  Musculoskeletal: TRAORE  Eyes: non-icteric  Extremities: wraped in ace bandages, able to move spontaneously   Psychological: appropriate mood/affect      Recent Lab Results:    Results:    CBC: 11/17/2022 07:22              \     Hgb     /                              \     13.0 L    /  WBC  ----------------  Plt               9.1       ----------------    385              /     Hct     \                              /     38.4 L    \            RBC: 4.47 L    MCV: 86           RFP: 11/17/2022 07:22  NA+        Cl-     BUN  /                         139    104    17  /  --------------------------------  Glucose                ---------------------------  86    K+     HCO3-   Creat \                         4.7    25    0.88  \  Calcium : 9.7Anion Gap : 15       "    Albumin : 3.8     Phos : 4.2      Assessment and Plan:   Code Status:  ·  Code Status Full Code     Assessment:    Plan:    Neuo: acute pain  - Tylenol 650 mg Q4HR  - Robaxin 500 mg Q6HR  - Oxy 5/10 Q4HR PRN  - Gabapentin 300mg Q8HR    Cardio  - Vitals Qshift    Respiratory  - IS  - SpO2 >92%    GI  - CONTINUE regular diet   - Zofran and Phenergan PRN  - Bowel Regimen - senna, colace      - Monitor I/Os    Musculoskeletal  -NWB b/l LE, and LUE anticipate SNF placement  -ex fix to RLE, awaiting orthopedics plans for removal    Heme  - Lovenox 30mg Q12HR    Dispo: SNF approved. discharge    Patient seen and discussed with Dr. Ebenezer Deluca MD  PGY-1  General Surgery  Personal pager [phone], on [facility]      Attestation:   Note Completion:  I am a:  Resident/Fellow   Attending Attestation I saw and evaluated the patient.  I personally obtained the key and critical portions of the history and physical exam or was physically present for key and  critical portions performed by the resident/fellow. I reviewed the resident/fellow?s documentation and discussed the patient with the resident/fellow.  I agree with the resident/fellow?s medical decision making as documented in their note  with the exception/addition of the following:    I personally evaluated the patient on 18-Nov-2022   Comments/ Additional Findings    I evaluated and examined the patient with the surgical team. I agree with the above findings, assessment and plan.          Electronic Signatures:  Jarvis Deluca (Resident))  (Signed 18-Nov-2022 19:08)   Authored: Service, Subjective Data, Objective Data, Assessment  and Plan, Note Completion  Jordan Keane)  (Signed 18-Nov-2022 23:45)   Authored: Note Completion   Co-Signer: Service, Subjective Data, Objective Data, Assessment and Plan, Note Completion      Last Updated: 18-Nov-2022 23:45 by Jordan Keane)

## 2023-03-01 NOTE — DISCHARGE SUMMARY
Send Summary:   Discharge Summary Providers:  Provider Role Provider Name   · Referring Correct Info, Needed   · Attending Jordan Keane   · Consulting Silas Longo   · Primary Unknown, Pcp       Note Recipients: Nicolle Ramirez, Tomasa, Silas Dahl MD Radow, Brandon, MD  Unknown, Pcp, MD       Discharge:    Summary:   Admission Date: .10-Nov-2022 12:14:00   Discharge Date: 19-Nov-2022   Attending Physician at Discharge: Jordan Keane   Admission Reason: 25 ft fall, right malleolus fx.,  right subtalar subluxation fx.   Final Discharge Diagnoses: Talus fracture   Procedures: Date: 11-Nov-2022 10:58:00  Procedure Name: 1. Closed reduction of right subtalar joint dislocation   2. Closed management of talar head fracture   3. Closed management of posterior talar process fracture   4. Closed management of navicular fracture   5. Application of multiplanar ankle-spanning external fixator   6. Open treatment of left lateral talar process fracture   7. Open treatment of left extraarticular distal radius fracture   8. Extensor tendon tenotomy, brachioradialis  closed management of distal fibula   Condition at Discharge: Satisfactory   Disposition at Discharge: Skilled Nursing Facility  (SNF)   Vital Signs:        T   P  R  BP   MAP  SpO2   Value  36.1  71  18  160/79      99%  Date/Time 11/18 19:50 11/18 19:50 11/18 19:50 11/18 19:50    11/18 19:50  Range  (35.7C - 37C )  (57 - 88 )  (18 - 19 )  (130 - 160 )/ (76 - 85 )    (97% - 99% )  Highest temp of 37 C was recorded at 11/18 8:05    Date:            Weight/Scale Type:  Height:   11-Nov-2022 17:52  136  kg / bed  199.2  cm  Physical Exam:    AOx3. On room air, CTAB, respirations even and unlabored, thorax symmetric. abd soft, nontender, nondistended. Voids via yellow urine, in urinal. Ex fix RLE, Cast  splint LLE, Splint LUE.   Hospital Course:    25M presents after fall off ladder. The patient was intubated in the trauma bay due to suspected multiple  fractures needing reduction. Injuries included L radial  fx, R subtalar subluxation/dislocation, R intra-articular posterior talus fx, and R oblique lateral malleolus fracture. Orthopedics was consulted and recommended operative interventions. Patient went to OR for closed reduction of right subtalar joint  dislocation, closed management of talar head fracture, closed management of posterior talar process fracture, closed management of navicular fracture, application of ankle spanning Ex-Fix, open treatment of left lateral talar process fracture, open treatment  of left extra-articular distal radius fracture, extensor tendon tenotomy, closed management of distal fibula fracture. Admitted to trauma floor post-op. Patient voided post-op after arora removal. On POD4, patient had worsening abdominal pain and nausea/vomiting.  He was given zofran, reglan and phenergine. Patient was treated with rectal enema for concern for ileus. Ileus since resolved. Tolerating regular diet. +BM. PT/OT rec'd snf given 3 extremities non weightbearing. DIscharged to SNF with follow up appointment  with Ortho and scripts.       Discharge Information:    and Continuing Care:   Lab Results - Pending:    None  Radiology Results - Pending: None   Plan of Care Reviewed With: patient   Discharge Instructions:    Activity:           activity with assistance.          May not shower.  Cover splint and ex-fix with waterproof covering          May not return to school/work.            May not drive.            Weight-bearing Instructions: no weight-bearing bilateral leg.            No weight-bearing left arm    Nutrition/Diet:           resume normal diet    Wound Care:           Wound Site:   left arm, bilateral leg          Wound Type:   surgical incision          Other Instructions:   Keep incisions clean, dry, and covered with a dressing. No lotions, powders, or creams over incision lines. No tub soaks. If splint is in place, do not remove until  follow up appointment. Staples and/or sutures will be removed  in the office during your follow up appointment. Call the office immediately if you notice any sign of infection such as increased redness, warmth, thick drainage, wound separation, or spiking fever/chills.     Please keep cast/splint clean and dry until follow up visit. Please do not place objects into cast/splint. If becomes itchy, can blow cool air into cast/splint. Please avoid activities that risk falling (bike riding, etc.)    Rehab Services:           Occupational Therapy Orders:   3-5 times/week          Physical Therapy Orders:   3-5 times/week    Care Recommendation:           I recommend that INPATIENT care is required at::   Skilled          Estimated Stay:   Convalescent stay < 30 days    Follow Up Appointments:      Follow-Up Appointment 01:           Physician/Dept/Service:   Dr. Silas Longo MD - Orthopaedic Trauma Surgery          Reason for Referral:   Post operative wound check and suture/staple removal          Scheduled Date/Time:   30-Nov-2022 10:30          Location:   [address] [place] [address] [facility]          Phone Number:   [phone]    Discharge Medications: Home Medication   docusate sodium 100 mg oral capsule - 1 cap(s) orally 2 times a day  senna (sennosides) 8.6 mg oral tablet - 2 tab(s) orally once (at bedtime)  gabapentin 300 mg oral capsule - 1 cap(s) orally 3 times a day  methocarbamol 500 mg oral tablet - 1 tab(s) orally every 6 hours  aspirin 81 mg oral capsule - 1 cap(s) orally 2 times a day   Continue for 6 weeks for DVT prophylaxis      PRN Medication   oxyCODONE 5 mg oral tablet - 1 tab(s) orally every 4 hours, As needed, Pain 4-10   acetaminophen 325 mg oral tablet - 2 tab(s) orally every 6 hours, As Needed for pain 1-3     DNR Status:   ·  Code Status Code Status order at time of discharge: Full Code       Electronic Signatures:  Dianna Ambrose (APRN-CNP)  (Signed  19-Nov-2022 01:02)   Authored: Send Summary, Summary Content, Ongoing Care,  DNR Status, Note Completion      Last Updated: 19-Nov-2022 01:02 by Dianna Ambrose (APRN-CNP)

## 2023-03-01 NOTE — PROGRESS NOTES
Service: Trauma Surgery     Subjective Data:   LINDA PAULINO is a 32 year old Male who is Hospital Day # 5.     Patient reports tolerable pain, tolerating diet, and no acute overnight events.    Objective Data:     Objective Information:      T   P  R  BP   MAP  SpO2   Value  37  72  18  149/87      95%  Date/Time 11/14 12:24 11/14 12:24 11/14 12:24 11/14 12:24    11/14 12:24  Range  (36C - 37C )  (63 - 76 )  (16 - 18 )  (125 - 165 )/ (71 - 97 )    (95% - 97% )  Highest temp of 37 C was recorded at 11/14 12:24      Pain reported at 11/14 13:39: 7 = Severe    ---- Intake and Output  -----  Mn/Dy/Year Time  Intake   Output  Net  Nov 14, 2022 2:00 pm  0   1100  -1100  Nov 14, 2022 6:00 am  2080   800  1280  Nov 13, 2022 10:00 pm  120   0  120    The Intake and Output Totals for the last 24 hours are:      Intake   Output  Net      2200   800  1400    Physical Exam by System:    Constitutional: NAD, awake and alert   Eyes: EOMI, non-icteric   Head/Neck: Normocephalic, atraumatic   Respiratory/Thorax: Unlabored breathing on room air   Cardiovascular: Regular rate   Gastrointestinal: Soft, nontender, nondistended   Genitourinary: Voiding appropriately   Extremities: LUE, RLE, LLE acewrapped, RLE with external  fixator in place   Neurological: A & O X 3, grossly intact   Psychological: Appropriate mood and affect     Assessment and Plan:   Code Status:  ·  Code Status Full Code     Assessment:    Linda Paulino is a 25 year old male who fell 25 feet through a roof with a left radial fracture, right subtalar/talonavicular subluxation/dislocation, right intra-articular posterior  talus fracture, and right oblique fracture through lateral malleolus.     Plan:    Neuo: acute pain  - Tylenol 650 mg Q4HR  - Robaxin 500 mg Q6HR  - Oxy 5/10 Q4HR PRN  - Gabapentin 300mg Q8HR    Cardio  - Vitals Qshift    Respiratory  - IS  - SpO2 >92%    GI  - Regular diet  - Bowel Regimen - senna, colace      - Monitor  I/Os    Musculoskeletal  -NWB b/l LE, and LUE anticipate SNF placement  -ex fix to RLE, awaiting orthopedics plans for removal    Heme  - Lovenox 30mg Q12HR    Dispo pending SNF placement - requesting [place]    Patient seen and discussed with Dr. Alex Webb MD  PGY 1 General Surgery  Trauma Surgery #[id number]          Attestation:   Note Completion:  I am a:  Resident/Fellow   Attending Attestation I saw and evaluated the patient.  I personally obtained the key and critical portions of the history and physical exam or was physically present for key and  critical portions performed by the resident/fellow. I reviewed the resident/fellow?s documentation and discussed the patient with the resident/fellow.  I agree with the resident/fellow?s medical decision making as documented in the note.     I personally evaluated the patient on 14-Nov-2022   Comments/ Additional Findings    Patient remains in stable condition with no acute events overnight. Endorses adequate pain control. Continue supportive care for now while we proceed  with dispo planning to SNF as patient NWB to three out of four extremities.           Electronic Signatures:  Bacilio Florence (Resident))  (Signed 14-Nov-2022 15:35)   Authored: Service, Subjective Data, Objective Data, Assessment  and Plan, Note Completion  Pennie Johnson)  (Signed 14-Nov-2022 20:41)   Authored: Note Completion   Co-Signer: Service, Subjective Data, Objective Data, Assessment and Plan, Note Completion      Last Updated: 14-Nov-2022 20:41 by Pennie Johnson)

## 2023-03-01 NOTE — PROGRESS NOTES
Service: Trauma Surgery     Subjective Data:   Patient is Hospital Day # 4.     Pt doing well post op. No over night events. Pain is well controlled. Pt is tolerating diet. Denies difficulty with bm and urination.    Objective Data:     Objective Information:      T   P  R  BP   MAP  SpO2   Value  36.7  76  18  125/76      95%  Date/Time 11/13 8:22 11/13 8:22 11/13 8:22 11/13 8:22    11/13 8:22  Range  (36C - 36.9C )  (73 - 82 )  (18 - 20 )  (125 - 166 )/ (76 - 91 )    (92% - 99% )  Highest temp of 36.9 C was recorded at 11/12 19:02      Pain reported at 11/13 8:04: 8 = Severe    ---- Intake and Output  -----  Mn/Dy/Year Time  Intake   Output  Net  Nov 12, 2022 2:00 pm  0   600  -600    The Intake and Output Totals for the last 24 hours are:      Intake   Output  Net      null   600  null    Physical Exam by System:    Constitutional: well appearing young male in NAD   Eyes: PERRL, EOMI   ENMT: MMM   Head/Neck: NC. AT.   Respiratory/Thorax: CTAB. diminished at bases.   Cardiovascular: RRR, no mgr.   Gastrointestinal: abd s/nt/nd   Genitourinary: deferred   Musculoskeletal: splint to LUE, LLE. ex fix to RLE.  able to wiggle fingers and toes   Extremities: no cyanosis. see MSK.   Neurological: a and ox3, gcs 15   Psychological: appropriate mood and behavior.   Skin: warm and dry     Medication:    Medications:      ANTI-INFECTIVES:    1. ceFAZolin IV Piggy Back:  2  gram(s)  IntraVenous Piggyback  Every 8 Hours      CENTRAL NERVOUS SYSTEM AGENTS:    1. Acetaminophen:  650  mg  Oral  Every 4 Hours    2. oxyCODONE Immediate Release:  5  mg  Oral  Every 4 Hours   PRN       3. oxyCODONE Immediate Release:  10  mg  Oral  Every 4 Hours   PRN       4. oxyCODONE Immediate Release:  5  mg  Oral  Every 4 Hours   PRN       5. Gabapentin:  300  mg  Oral  3 Times a Day    6. Methocarbamol:  500  mg  Oral  Every 6 Hours      COAGULATION MODIFIERS:    1. Enoxaparin SubCutaneous:  30  mg  SubCutaneous  Every 12 Hours       GASTROINTESTINAL AGENTS:    1. Docusate:  100  mg  Oral  2 Times a Day    2. Sennosides:  2  tablet(s)  Oral  At Bedtime      NUTRITIONAL PRODUCTS:    1. Lactated Ringers Infusion:  1000  mL  IntraVenous  <Continuous>      Recent Lab Results:    Results:        I have reviewed these laboratory results:    Complete Blood Count  Trending View      Result 13-Nov-2022 07:27:00  12-Nov-2022 06:13:00  11-Nov-2022 14:53:00    White Blood Cell Count 9.1   10.0   8.7    Nucleated Erythrocyte Count 0.0   0.0   0.0    Red Blood Cell Count 3.99   L   3.71   L   4.12   L    HGB 11.6   L   10.9   L   11.9   L    HCT 35.7   L   33.1   L   35.8   L    MCV 89   89   87    MCHC 32.5   32.9   33.2       309   314    RDW-CV 12.8   13.0   12.7        Renal Function Panel  Trending View      Result 13-Nov-2022 07:27:00  11-Nov-2022 14:53:00    Glucose, Serum 94   140   H       140    K 4.0   3.8       104    Bicarbonate, Serum 25   26    Anion Gap, Serum 12   14    BUN 9   12    CREAT 0.86   0.97    GFR Male 67   60    Calcium, Serum 8.7   8.4   L    Phosphorus, Serum 2.5   2.5    ALB 3.4   3.6          Assessment and Plan:   Code Status:  ·  Code Status Full Code     Assessment:    25 year old male fell 25 feet after fall, intubated in trauma bay for reduction of R ankle dislocation, aspirated in CT scanner    List of Clinically Significant Injuries/Issues:   -L radial fx  -R subtalar/talonavicular subluxation/dislocation.  -R Intra-articular posterior talus fx  -R Oblique fracture through the lateral malleolus    Plan:  -NWB b/l LE, and LUE anticipate SNF placement  -ex fix to RLE, ortho planning 6-8 weeks before removal.  -ancef x24h  -tylenol, oxy 5/10, oxy 5 BT, robaxin, gabapentin  -lovenox  -senna, colace  -PT/OT --> anticipate SNF    20 minutes spent with patient reviewing VSS/medications, obtaining subjective information, performing physical exam, discussing plan of care;  with greater than 50% of that  time spent in patient room.     Patient discussed with Dr. Keane.     Jose Sol PA-C  Trauma Surgery  [id number]        Electronic Signatures:  Jose Sol (PAC)  (Signed 13-Nov-2022 09:55)   Authored: Service, Subjective Data, Objective Data, Assessment  and Plan, Note Completion      Last Updated: 13-Nov-2022 09:55 by Jose Sol (PAC)

## 2023-03-01 NOTE — PROGRESS NOTES
"      Service: Trauma Surgery     Subjective Data:   LINDA TOSCANO is a 32 year old Male who is Hospital Day # 7.     NAEO. Seen today on rounds. Reports pain in left extremity and has had \"nothing to eat for three days\".    Objective Data:     Objective Information:      T   P  R  BP   MAP  SpO2   Value  36.5  69  18  165/92   122  97%  Date/Time 11/16 3:49 11/16 3:49 11/16 3:49 11/16 3:49  11/15 18:12 11/16 3:49  Range  (36.3C - 37.1C )  (52 - 69 )  (18 - 20 )  (151 - 168 )/ (88 - 99 )  (110 - 122 )  (94% - 97% )  Highest temp of 37.1 C was recorded at 11/15 4:54      Pain reported at 11/15 21:00: 0 = None    ---- Intake and Output  -----  Mn/Dy/Year Time  Intake   Output  Net  Nov 16, 2022 6:00 am  0   700  -700  Nov 15, 2022 10:00 pm  130   200  -70  Nov 15, 2022 2:00 pm  1040   800  240    The Intake and Output Totals for the last 24 hours are:      Intake   Output  Net      1170   1700  -530    Recent Lab Results:    Results:    CBC: 11/15/2022 09:10              \     Hgb     /                              \     Canceled       /  WBC  ----------------  Plt               Canceled       ----------------    Canceled              /     Hct     \                              /     Canceled       \            RBC: Canceled     MCV: Canceled     Neutrophil %: Canceled      RFP: 11/15/2022 07:45  NA+        Cl-     BUN  /                         137    102    15  /  --------------------------------  Glucose                ---------------------------  110 H    K+     HCO3-   Creat \                         4.2    25    0.84  \  Calcium : 9.6Anion Gap : 14          Albumin : 4.1     Phos : 3.6      Assessment and Plan:   Code Status:  ·  Code Status Full Code     Assessment:    Plan:    Neuo: acute pain  - Tylenol 650 mg Q4HR  - Robaxin 500 mg Q6HR  - Oxy 5/10 Q4HR PRN  - Gabapentin 300mg Q8HR    Cardio  - Vitals Qshift    Respiratory  - IS  - SpO2 >92%    GI  - Advanced to regular diet   - Zofran and Phenergan " PRN  - Bowel Regimen - senna, colace      - Monitor I/Os  - STOP IVF    Musculoskeletal  -NWB b/l LE, and LUE anticipate SNF placement  -ex fix to RLE, awaiting orthopedics plans for removal    Heme  - Lovenox 30mg Q12HR    Dispo pending SNF placement - requesting [place]    Patient seen and discussed with Dr. Ebenezer Deluca MD  PGY-1  General Surgery  Personal pager [phone], on [facility]      Attestation:   Note Completion:  I am a:  Resident/Fellow   Attending Attestation I saw and evaluated the patient.  I personally obtained the key and critical portions of the history and physical exam or was physically present for key and  critical portions performed by the resident/fellow. I reviewed the resident/fellow?s documentation and discussed the patient with the resident/fellow.  I agree with the resident/fellow?s medical decision making as documented in the note.     I personally evaluated the patient on 16-Nov-2022         Electronic Signatures:  Jarvis Deluca (Resident))  (Signed 16-Nov-2022 18:31)   Authored: Service, Subjective Data, Objective Data, Assessment  and Plan, Note Completion  Zainab Sol)  (Signed 17-Nov-2022 12:23)   Authored: Note Completion   Co-Signer: Service, Subjective Data, Objective Data, Assessment and Plan, Note Completion      Last Updated: 17-Nov-2022 12:23 by Zainab Sol)

## 2023-03-01 NOTE — H&P
History & Physical Reviewed:   I have reviewed the History and Physical dated:  10-Nov-2022   History and Physical reviewed and relevant findings noted. Patient examined to review pertinent physical  findings.: No significant changes   Home Medications Reviewed: no changes noted   Allergies Reviewed: no changes noted       ERAS (Enhanced Recovery After Surgery):  ·  ERAS Patient: no     Consent:   COVID-19 Consent:  ·  COVID-19 Risk Consent Surgeon has reviewed key risks related to the risk of martir COVID-19 and if they contract COVID-19 what the risks are.     Attestation:   Note Completion:  I am a:  Resident/Fellow   Attending Attestation I saw and evaluated the patient.  I personally obtained the key and critical portions of the history and physical exam or was physically present for key and  critical portions performed by the resident/fellow. I reviewed the resident/fellow?s documentation and discussed the patient with the resident/fellow.  I agree with the resident/fellow?s medical decision making as documented in the note.     I personally evaluated the patient on 11-Nov-2022         Electronic Signatures:  Carmelo Steinberg (Resident))  (Signed 11-Nov-2022 04:39)   Authored: History & Physical Reviewed, ERAS, Consent,  Note Completion  Silas Longo)  (Signed 11-Nov-2022 10:30)   Authored: Note Completion   Co-Signer: History & Physical Reviewed, ERAS, Consent, Note Completion      Last Updated: 11-Nov-2022 10:30 by Silas Longo)

## 2023-03-01 NOTE — PROGRESS NOTES
Service: Orthopaedics     Subjective Data:   Patient is Hospital Day # 2.     Pt extubated overnight.  Plan for OR today.    Objective Data:     Objective Information:      T   P  R  BP   MAP  SpO2   Value  36.9  97  20  122/65   83  92%  Date/Time 11/10 12:13 11/11 4:49 11/11 4:07 11/11 4:49  11/11 4:49 11/11 4:49  Range  (36.9C - 36.9C )  (68 - 105 )  (12 - 25 )  (119 - 168 )/ (65 - 120 )  (78 - 134 )  (91% - 100% )  Highest temp of 36.9 C was recorded at 11/10 12:13      Pain reported at 11/11 4:49: sleeping    Physical Exam Narrative:  ·  Physical Exam:    - Constitutional: Sedated   - Eyes: EOM grossly intact  - Head/Neck: Trachea midline  - Respiratory/Thorax: Intubated  - Cardiovascular: RRR on peripheral palpation  - Gastrointestinal: Nondistended  - Psychological: Appropriate mood/behavior  - Skin: Warm and dry. Additional findings in musculoskeletal evaluation  - Musculoskeletal:  ---  RLE:  - SLS c/d/i  - Compartments soft and compressible  -Motor intact in DF/PF/EHL/FHL  - SILT in Lopez/Sa/SP/DP/T distributions  - 2+ DP pulse    LLE:    -SLS c/d/i  -Motor intact in DF/PF/EHL/FHL  -SILT in saph/sural/SPN/DPN distributions  -Foot warm, well perfused  - 2+ DP pulse    LUE:   -splint intact.   -Motor intact in axillary/AIN/PIN/ulnar distributions  -SILT axillary/radial/median/ulnar distributions  -Hand warm, well perfused  - 2+ radial pulse  -Compartments soft and compressible.    Recent Lab Results:    Results:    Coagulation: 11/11/2022 05:00  PT  /                    13.4  /  -------<    INR          ----------<      1.2 H  PTT\                    31  \                       Assessment and Plan:   Daily Risk Screen:  ·  Does patient have an indwelling urinary catheter? yes   ·  Plan for indwelling urinary catheter removal today? no    ·  The patient continues to require indwelling urinary catheterization for perioperative use for selected surgical procedures     Comorbidities:  ·  Comorbidity obesity    ·  Obesity obesity (BMI 35-39.9)   ·  BMI 37.28     Code Status:  ·  Code Status Full Code     Assessment:    Injury:   R talar head, navicular, and distal fibula fx with associated ST and TN dislocation, L lateral process talus fx, and L distal radius fx  HPI:   25M otherwise healthy presents with above following fall from 25ft ladder at work. Closed, NVI. Intubated and sedated shortly after arrival due to agitation. R ST & TN joints closed reduced. LLE placed in SLS. LUE placed in volar/dorsal slabs. Post reduction  CT shows reduced ST and TN joints.     Plan: C/p R ankle ORIF vs ex-fix, L ankle ORIF  vs CRPP, and L wrist ORIF with Dr. Longo on 11/11. Clear per trauma, please document medical optimization when able.   - Consented and posted to OR schedule 11/11 with Dr. Longo  - NPO for upcoming procedure  - NWB R LE in SLS, NWB L LE in SLS, NWB LUE in dorsal/volar splint  - Hold DVT ppx in the setting of upcoming surgery   - Please obtain pre-op labs (CBC, BMP, Coags, T&S, EKG, CXR, COVID) PRIOR TO TRANSFER TO FLOOR    Consult was discussed with attending, Dr. Donta Steinberg MD  Orthopaedic Surgery PGY1  p. [phone]  Available on Recommind    After 6pm and on weekends please page ortho on-call [phone].    The patient will be followed by the Orthopedic Trauma service. Please page the corresponding resident (below) with questions or concerns.  Ortho Trauma Service: (all available via Recommind)  1st call: Carmelo Steinberg MD, PGY1 ([phone])  2nd call: Mukul Camp DO, PGY3  3rd call: Bob Cisse MD, PGY3 ([phone])      Attestation:   Note Completion:  I am a:  Resident/Fellow   Attending Attestation I saw and evaluated the patient.  I personally obtained the key and critical portions of the history and physical exam or was physically present for key and  critical portions performed by the resident/fellow. I reviewed the resident/fellow?s documentation and discussed the patient with the resident/fellow.  I agree with  the resident/fellow?s medical decision making as documented in the note.     I personally evaluated the patient on 11-Nov-2022         Electronic Signatures:  Carmelo Steinberg (Resident))  (Signed 11-Nov-2022 06:34)   Authored: Service, Subjective Data, Objective Data, Assessment  and Plan, Note Completion  Silas Longo)  (Signed 11-Nov-2022 10:32)   Authored: Assessment and Plan, Note Completion   Co-Signer: Service, Subjective Data, Objective Data, Assessment and Plan, Note Completion      Last Updated: 11-Nov-2022 10:32 by Silas Longo)

## 2023-03-01 NOTE — PROGRESS NOTES
"      Service: Trauma Surgery     Subjective Data:   LINDA TOSCANO is a 32 year old Male who is Hospital Day # 8.     NAEO. Pt seen during am rounds. Reports \"I'm ready to go to my next step\" Reports pain as \"throbbing\" but \"not terrible\"  Denies any fever, chills, n/v.    Objective Data:     Objective Information:      T   P  R  BP   MAP  SpO2   Value  36.1  80  18  131/80      96%  Date/Time 11/17 11:40 11/17 11:40 11/17 11:40 11/17 11:40    11/17 11:40  Range  (35.5C - 36.6C )  (57 - 88 )  (18 - 18 )  (113 - 165 )/ (68 - 95 )    (94% - 99% )      Pain reported at 11/16 22:00: 3 = Mild    ---- Intake and Output  -----  Mn/Dy/Year Time  Intake   Output  Net  Nov 17, 2022 6:00 am  0   0  0  Nov 16, 2022 10:00 pm  240   0  240  Nov 16, 2022 2:00 pm  0   450  -450    The Intake and Output Totals for the last 24 hours are:      Intake   Output  Net      240   450  -210    Physical Exam Narrative:  ·  Physical Exam:    Constitutional: No acute distress  Neurological: Awake, alert, conversive  Cardiovascular: no acute issues  Head/Neck: NCAT  Respiratory/Thorax: even, unlabored, breathing regularly on RA  Gastrointestinal: soft nontender, nondistended  Skin: warm, dry  Musculoskeletal: TRAORE  Eyes: non-icteric  Extremities: wraped in ace bandages, able to move spontaneously   Psychological: appropriate mood/affect      Recent Lab Results:    Results:    CBC: 11/17/2022 07:22              \     Hgb     /                              \     13.0 L    /  WBC  ----------------  Plt               9.1       ----------------    385              /     Hct     \                              /     38.4 L    \            RBC: 4.47 L    MCV: 86           RFP: 11/17/2022 07:22  NA+        Cl-     BUN  /                         139    104    17  /  --------------------------------  Glucose                ---------------------------  86    K+     HCO3-   Creat \                         4.7    25    0.88  \  Calcium : 9.7Anion Gap " : 15          Albumin : 3.8     Phos : 4.2      Assessment and Plan:   Code Status:  ·  Code Status Full Code     Assessment:    Plan:    Neuo: acute pain  - Tylenol 650 mg Q4HR  - Robaxin 500 mg Q6HR  - Oxy 5/10 Q4HR PRN  - Gabapentin 300mg Q8HR    Cardio  - Vitals Qshift    Respiratory  - IS  - SpO2 >92%    GI  - CONTINUE regular diet   - Zofran and Phenergan PRN  - Bowel Regimen - senna, colace      - Monitor I/Os    Musculoskeletal  -NWB b/l LE, and LUE anticipate SNF placement  -ex fix to RLE, awaiting orthopedics plans for removal    Heme  - Lovenox 30mg Q12HR    Dispo pending SNF placement - requesting [facility]. Awaiting pre-certification    Patient seen and discussed with Dr. Ebenezer Deluca MD  PGY-1  General Surgery  Personal pager [phone], on [employer] [facility]      Attestation:   Note Completion:  I am a:  Resident/Fellow   Attending Attestation I saw and evaluated the patient.  I personally obtained the key and critical portions of the history and physical exam or was physically present for key and  critical portions performed by the resident/fellow. I reviewed the resident/fellow?s documentation and discussed the patient with the resident/fellow.  I agree with the resident/fellow?s medical decision making as documented in the note.     I personally evaluated the patient on 17-Nov-2022   Comments/ Additional Findings    Pending precert for rehab placement.          Electronic Signatures:  Jarvis Deluca (Resident))  (Signed 17-Nov-2022 13:03)   Authored: Service, Subjective Data, Objective Data, Assessment  and Plan, Note Completion  Zainab Sol)  (Signed 17-Nov-2022 15:19)   Authored: Note Completion   Co-Signer: Service, Subjective Data, Objective Data, Assessment and Plan, Note Completion      Last Updated: 17-Nov-2022 15:19 by Zainab Sol)

## 2023-03-01 NOTE — PROGRESS NOTES
Service: Trauma Surgery     Subjective Data:   Patient is Hospital Day # 2.     admitted to floor following operative intervention of b/l ankle fx dislocation and L radius.    Objective Data:     Objective Information:      T   P  R  BP   MAP  SpO2   Value  36.9  97  20  122/65   83  92%  Date/Time 11/10 12:13 11/11 4:49 11/11 4:07 11/11 4:49  11/11 4:49 11/11 4:49  Range  (36.9C - 36.9C )  (68 - 105 )  (12 - 25 )  (119 - 168 )/ (65 - 120 )  (78 - 134 )  (91% - 100% )  Highest temp of 36.9 C was recorded at 11/10 12:13      Pain reported at 11/11 14:00: sleeping    Physical Exam Narrative:  ·  Physical Exam:    GCS 15  Head: No gross skull deformities  Eyes: EOMI  ENT: No nasal bleeding  C spine: No step offs/deformities  Chest: Non tender, no crepitus, equal chest movement  Abdomen: Soft, non distended, non tender, LLQ abrasion   Pelvis: Stable to palpation  Rectal: No gross blood noted  Genitourinary: Normal external genitalia, no blood at the meatus  Extremities: b/l LE in splints. LUE in splint  Back/Spine: no step offs/deformities or tenderness to palpation  Neuro: Grossly normal sensation.      Medication:    Medications:      ANTI-INFECTIVES:    1. ceFAZolin IV Piggy Back:  2  gram(s)  IntraVenous Piggyback  Every 8 Hours    2. ceFAZolin IV Piggy Back:  2  gram(s)  IntraVenous Piggyback  Every 8 Hours    3. ceFAZolin IV Piggy Back:  2  gram(s)  IntraVenous Piggyback  Every 8 Hours      CARDIOVASCULAR AGENTS:    1. Labetalol Injectable:  5  mg  IntraVenous Push  Once   PRN       2. hydrALAZINE (APRESOLINE) Injectable:  5  mg  IntraVenous Push  Every 30 Minutes   PRN         CENTRAL NERVOUS SYSTEM AGENTS:    1. Acetaminophen:  650  mg  Oral  Every 4 Hours   PRN       2. HYDROmorphone Injectable:  0.2  mg  IntraVenous Push  Every 5 Minutes   PRN       3. HYDROmorphone Injectable:  0.4  mg  IntraVenous Push  Every 5 Minutes   PRN       4. oxyCODONE Immediate Release:  5  mg  Oral  Every 4 Hours   PRN        5. oxyCODONE Immediate Release:  10  mg  Oral  Every 4 Hours   PRN       6. Ondansetron Injectable:  4  mg  IntraVenous Push  Once   PRN       7. Promethazine IV Piggy Back:  6.25  mg  IntraVenous Piggyback  Once   PRN       8. Midazolam Injectable:  1  mg  IntraVenous Push  Once   PRN         MISCELLANEOUS AGENTS:    1. Naloxone Injectable:  0.2  mg  IntraVenous Push  Once   PRN         NUTRITIONAL PRODUCTS:    1. Lactated Ringers Infusion:  1000  mL  IntraVenous  <Continuous>      RESPIRATORY AGENTS:    1. Albuterol 2.5 mg/ 3 mL Nebulizer Soln:  3  mL  Inhalation  Once   PRN         Recent Lab Results:    Results:    Coagulation: 11/11/2022 05:00  PT  /                    13.4  /  -------<    INR          ----------<      1.2 H  PTT\                    31  \                       Assessment and Plan:   Daily Risk Screen:  ·  Does patient have an indwelling urinary catheter? yes   ·  Plan for indwelling urinary catheter removal today? yes          Admitting Dx:   Fall on/from ladder, initial encounter: Entered Date: 10-Nov-2022  14:06       Additional Dx:   Ankle fracture: Entered Date: 11-Nov-2022 14:06   Body mass index [BMI] 37.0-37.9, adult: Entered Date: 11-Nov-2022  06:34    Comorbidities:  ·  Comorbidity Other     Code Status:  ·  Code Status Full Code     Assessment:    25 year old male fell 25 feet after fall, intubated in trauma bay for reduction of R ankle dislocation, aspirated in CT scanner    List of Clinically Significant Injuries/Issues:   -L radial fx  -R subtalar/talonavicular subluxation/dislocation.  -R Intra-articular posterior talus fx  -R Oblique fracture through the lateral malleolus    Plan:  -admit to Telemetry floor post operatively.   -NWB b/l LE, and LUE anticipate SNF placement  -ancef x24h  -arora out  -tylenol, oxy 5/10  -lovenox  -senna, colace      Patient discussed with Dr. Keane.     Jose Sol PA-C  Trauma Surgery  [id number]      Electronic Signatures:  Jose Sol  (PAC)  (Signed 12-Nov-2022 07:00)   Authored: Service, Subjective Data, Objective Data, Assessment  and Plan, Note Completion      Last Updated: 12-Nov-2022 07:00 by Jose Sol (PAC)

## 2023-05-05 DIAGNOSIS — G89.29 OTHER CHRONIC PAIN: ICD-10-CM

## 2023-09-14 NOTE — DISCHARGE SUMMARY
Send Summary:   Discharge Summary Providers:  Provider Role Provider Name   · Referring Correct Info, Needed   · Attending Jordan Keane   · Consulting Silas Longo   · Primary Unknown, Pcp       Note Recipients: Nicolle Ramirez, Tomasa, Silas Dahl MD Radow, Brandon, MD  Unknown, Pcp, MD       Discharge:    Summary:   Admission Date: .10-Nov-2022 12:14:00   Discharge Date: 19-Nov-2022   Attending Physician at Discharge: Jordan Keane   Admission Reason: 25 ft fall, right malleolus fx.,  right subtalar subluxation fx.   Final Discharge Diagnoses: Talus fracture   Procedures: Date: 11-Nov-2022 10:58:00  Procedure Name: 1. Closed reduction of right subtalar joint dislocation   2. Closed management of talar head fracture   3. Closed management of posterior talar process fracture   4. Closed management of navicular fracture   5. Application of multiplanar ankle-spanning external fixator   6. Open treatment of left lateral talar process fracture   7. Open treatment of left extraarticular distal radius fracture   8. Extensor tendon tenotomy, brachioradialis  closed management of distal fibula   Condition at Discharge: Satisfactory   Disposition at Discharge: Skilled Nursing Facility  (SNF)   Vital Signs:        T   P  R  BP   MAP  SpO2   Value  36.1  71  18  160/79      99%  Date/Time 11/18 19:50 11/18 19:50 11/18 19:50 11/18 19:50    11/18 19:50  Range  (35.7C - 37C )  (57 - 88 )  (18 - 19 )  (130 - 160 )/ (76 - 85 )    (97% - 99% )  Highest temp of 37 C was recorded at 11/18 8:05    Date:            Weight/Scale Type:  Height:   11-Nov-2022 17:52  136  kg / bed  199.2  cm  Physical Exam:    AOx3. On room air, CTAB, respirations even and unlabored, thorax symmetric. abd soft, nontender, nondistended. Voids via yellow urine, in urinal. Ex fix RLE, Cast  splint LLE, Splint LUE.   Hospital Course:    25M presents after fall off ladder. The patient was intubated in the trauma bay due to suspected multiple  fractures needing reduction. Injuries included L radial  fx, R subtalar subluxation/dislocation, R intra-articular posterior talus fx, and R oblique lateral malleolus fracture. Orthopedics was consulted and recommended operative interventions. Patient went to OR for closed reduction of right subtalar joint  dislocation, closed management of talar head fracture, closed management of posterior talar process fracture, closed management of navicular fracture, application of ankle spanning Ex-Fix, open treatment of left lateral talar process fracture, open treatment  of left extra-articular distal radius fracture, extensor tendon tenotomy, closed management of distal fibula fracture. Admitted to trauma floor post-op. Patient voided post-op after arora removal. On POD4, patient had worsening abdominal pain and nausea/vomiting.  He was given zofran, reglan and phenergine. Patient was treated with rectal enema for concern for ileus. Ileus since resolved. Tolerating regular diet. +BM. PT/OT rec'd snf given 3 extremities non weightbearing. DIscharged to SNF with follow up appointment  with Ortho and scripts.       Discharge Information:    and Continuing Care:   Lab Results - Pending:    None  Radiology Results - Pending: None   Plan of Care Reviewed With: patient   Discharge Instructions:    Activity:           activity with assistance.          May not shower.  Cover splint and ex-fix with waterproof covering          May not return to school/work.            May not drive.            Weight-bearing Instructions: no weight-bearing bilateral leg.            No weight-bearing left arm    Nutrition/Diet:           resume normal diet    Wound Care:           Wound Site:   left arm, bilateral leg          Wound Type:   surgical incision          Other Instructions:   Keep incisions clean, dry, and covered with a dressing. No lotions, powders, or creams over incision lines. No tub soaks. If splint is in place, do not remove until  follow up appointment. Staples and/or sutures will be removed  in the office during your follow up appointment. Call the office immediately if you notice any sign of infection such as increased redness, warmth, thick drainage, wound separation, or spiking fever/chills.     Please keep cast/splint clean and dry until follow up visit. Please do not place objects into cast/splint. If becomes itchy, can blow cool air into cast/splint. Please avoid activities that risk falling (bike riding, etc.)    Rehab Services:           Occupational Therapy Orders:   3-5 times/week          Physical Therapy Orders:   3-5 times/week    Care Recommendation:           I recommend that INPATIENT care is required at::   Skilled          Estimated Stay:   Convalescent stay < 30 days    Follow Up Appointments:      Follow-Up Appointment 01:           Physician/Dept/Service:   Dr. Silas Longo MD - Orthopaedic Trauma Surgery          Reason for Referral:   Post operative wound check and suture/staple removal          Scheduled Date/Time:   30-Nov-2022 10:30          Location:   [address] [place] [address] [address] [facility]          Phone Number:   [phone]    Discharge Medications: Home Medication   docusate sodium 100 mg oral capsule - 1 cap(s) orally 2 times a day  senna (sennosides) 8.6 mg oral tablet - 2 tab(s) orally once (at bedtime)  gabapentin 300 mg oral capsule - 1 cap(s) orally 3 times a day  methocarbamol 500 mg oral tablet - 1 tab(s) orally every 6 hours  aspirin 81 mg oral capsule - 1 cap(s) orally 2 times a day   Continue for 6 weeks for DVT prophylaxis      PRN Medication   oxyCODONE 5 mg oral tablet - 1 tab(s) orally every 4 hours, As needed, Pain 4-10   acetaminophen 325 mg oral tablet - 2 tab(s) orally every 6 hours, As Needed for pain 1-3     DNR Status:   ·  Code Status Code Status order at time of discharge: Full Code       Electronic Signatures:  Dianna Ambrose (APRN-CNP)  (Signed  19-Nov-2022 01:02)   Authored: Send Summary, Summary Content, Ongoing Care,  DNR Status, Note Completion      Last Updated: 19-Nov-2022 01:02 by Dianna Ambrose (APRN-CNP)

## 2023-09-14 NOTE — PROGRESS NOTES
Service: Orthopaedics     Subjective Data:   Patient is Hospital Day # 3.     Patient seen and examined at bedside. No acute events overnight. Resting comfortably, on room air. A/ox3 and cooperative with exam. Dressings all c/d/i, pain well controlled, tolerating diet, urinating  and passing gas. No complaints at this time. Denies fever, chills, CP, SOB.    Objective Data:     Objective Information:      T   P  R  BP   MAP  SpO2   Value  36.5  78  18  157/91   83  98%  Date/Time 11/12 8:27 11/12 8:27 11/12 8:27 11/12 8:27 11/11 4:49 11/12 8:27  Range  (36.5C - 36.7C )  (72 - 109 )  (18 - 20 )  (119 - 166 )/ (65 - 91 )  (78 - 106 )  (92% - 99% )      Pain reported at 11/12 9:52: 10 = Severe    ---- Intake and Output  -----  Mn/Dy/Year Time  Intake   Output  Net  Nov 12, 2022 6:00 am  0   1350  -1350  Nov 11, 2022 10:00 pm  800   445  355  Nov 11, 2022 2:00 pm  1200   700  500    The Intake and Output Totals for the last 24 hours are:      Intake   Output  Net      2000   0060  -548    Physical Exam Narrative:  ·  Physical Exam:    - Constitutional: No acute distress, cooperative  - Eyes: EOM grossly intact  - Head/Neck: Trachea midline  - Respiratory/Thorax: NWOB  - Cardiovascular: RRR on peripheral palpation  - Gastrointestinal: Nondistended  - Psychological: Appropriate mood/behavior  - Skin: Warm and dry. Additional findings in musculoskeletal evaluation  - Musculoskeletal:  ---  LUE  - volar slab splint and dressings c/d/i  - Gross motor function of the AIN, PIN, radial, ulnar and median nerves intact  - SILT C5-T1  - compartments are soft and compressible    RLE  - AS external fixator intact, pin sites c/d  - SILT L2-S1  - wiggles toes, 3/5 EHL, FHL  - compartments soft and compressible    LLE  - Bulky Erazo splint c/d/i on LLE  - SILT L2-S1  - wiggles toes, 3/5 with EHL, FHL  - compartments are soft and compressible, no pain with passive f/e of toes    Recent Lab Results:    Results:    CBC: 11/12/2022  06:13              \     Hgb     /                              \     10.9 L    /  WBC  ----------------  Plt               10.0       ----------------    309              /     Hct     \                              /     33.1 L    \            RBC: 3.71 L    MCV: 89           RFP: 11/11/2022 14:53  NA+        Cl-     BUN  /                         140    104    12  /  --------------------------------  Glucose                ---------------------------  140 H    K+     HCO3-   Creat \                         3.8    26    0.97  \  Calcium : 8.4 LAnion Gap : 14          Albumin : 3.6     Phos : 2.5      Assessment and Plan:   Daily Risk Screen:  ·  Does patient have an indwelling urinary catheter? yes   ·  Plan for indwelling urinary catheter removal today? no    ·  The patient continues to require indwelling urinary catheterization for perioperative use for selected surgical procedures   ·  Does patient have a central line? n/a consulting service     Code Status:  ·  Code Status Full Code     Assessment:    Orthopedic Assessment  1. Closed L distal radius fracture, s/p ORIF L distal radius 11/11  2. Closed L talus lateral process fracture, s/p ORIF L talus on 11/11  3. Closed R talar body, R navicular, R cuboid fractures with subtalar and talonavicular joint dislocation, s/p closed reduction, s/p application of AS ex fix on 11/11      Orthopedic Plan of Care  - WB: NWB LUE, NWB LLE, NWB RLE  - Diet: regular ok per ortho  - VTE ppx: ok to restart per ortho, choice of chemical ppx per primary  - Pain control: per primary team  - Post-op abx: ancef 2g q8h x3  - PT OT  - Ice and elevate  - Maintain splints and dressings     Dispo: no further surgical intervention anticipated during current stay. Ex fix removal at 6-8 weeks post-op. Orthopedics to continue to follow, likely will require SNF placement 2/2 multiple [facility] extremities.     D/w attending physician Dr Donta Camp DO, PGY3    Please contact  ortho pager [phone] ([phone]) after 6 pm or on weekends    Ortho Trauma Service: (all available via [employer])  1st call: Carmelo Setinberg MD, PGY-1 ([phone])  2nd call: Bob Cisse MD, PGY3 ([phone])      Attestation:   Note Completion:  I am a:  Resident/Fellow   Attending Attestation I reviewed the resident/fellow?s documentation and discussed the patient with the resident/fellow.  I agree with the resident/fellow?s medical  decision making as documented in the note.         Electronic Signatures:  Mukul Camp)  (Signed 12-Nov-2022 12:38)   Authored: Service, Subjective Data, Objective Data, Assessment  and Plan, Note Completion  Silas Longo)  (Signed 12-Nov-2022 14:23)   Authored: Assessment and Plan   Co-Signer: Service, Subjective Data, Objective Data, Assessment and Plan, Note Completion      Last Updated: 12-Nov-2022 14:23 by Silas Longo)

## 2023-09-14 NOTE — PROGRESS NOTES
"      Service: Trauma Surgery     Subjective Data:   LINDA TOSCANO is a 32 year old Male who is Hospital Day # 8.     NAEO. Pt seen during am rounds. Reports \"I'm ready to go to my next step\" Reports pain as \"throbbing\" but \"not terrible\"  Denies any fever, chills, n/v.    Objective Data:     Objective Information:      T   P  R  BP   MAP  SpO2   Value  36.1  80  18  131/80      96%  Date/Time 11/17 11:40 11/17 11:40 11/17 11:40 11/17 11:40    11/17 11:40  Range  (35.5C - 36.6C )  (57 - 88 )  (18 - 18 )  (113 - 165 )/ (68 - 95 )    (94% - 99% )      Pain reported at 11/16 22:00: 3 = Mild    ---- Intake and Output  -----  Mn/Dy/Year Time  Intake   Output  Net  Nov 17, 2022 6:00 am  0   0  0  Nov 16, 2022 10:00 pm  240   0  240  Nov 16, 2022 2:00 pm  0   450  -450    The Intake and Output Totals for the last 24 hours are:      Intake   Output  Net      240   450  -210    Physical Exam Narrative:  ·  Physical Exam:    Constitutional: No acute distress  Neurological: Awake, alert, conversive  Cardiovascular: no acute issues  Head/Neck: NCAT  Respiratory/Thorax: even, unlabored, breathing regularly on RA  Gastrointestinal: soft nontender, nondistended  Skin: warm, dry  Musculoskeletal: TRAORE  Eyes: non-icteric  Extremities: wraped in ace bandages, able to move spontaneously   Psychological: appropriate mood/affect      Recent Lab Results:    Results:    CBC: 11/17/2022 07:22              \     Hgb     /                              \     13.0 L    /  WBC  ----------------  Plt               9.1       ----------------    385              /     Hct     \                              /     38.4 L    \            RBC: 4.47 L    MCV: 86           RFP: 11/17/2022 07:22  NA+        Cl-     BUN  /                         139    104    17  /  --------------------------------  Glucose                ---------------------------  86    K+     HCO3-   Creat \                         4.7    25    0.88  \  Calcium : 9.7Anion Gap " : 15          Albumin : 3.8     Phos : 4.2      Assessment and Plan:   Code Status:  ·  Code Status Full Code     Assessment:    Plan:    Neuo: acute pain  - Tylenol 650 mg Q4HR  - Robaxin 500 mg Q6HR  - Oxy 5/10 Q4HR PRN  - Gabapentin 300mg Q8HR    Cardio  - Vitals Qshift    Respiratory  - IS  - SpO2 >92%    GI  - CONTINUE regular diet   - Zofran and Phenergan PRN  - Bowel Regimen - senna, colace      - Monitor I/Os    Musculoskeletal  -NWB b/l LE, and LUE anticipate SNF placement  -ex fix to RLE, awaiting orthopedics plans for removal    Heme  - Lovenox 30mg Q12HR    Dispo pending SNF placement - requesting [facility]. Awaiting pre-certification    Patient seen and discussed with Dr. Ebenezer Deluca MD  PGY-1  General Surgery  Personal pager [phone], on [facility]      Attestation:   Note Completion:  I am a:  Resident/Fellow   Attending Attestation I saw and evaluated the patient.  I personally obtained the key and critical portions of the history and physical exam or was physically present for key and  critical portions performed by the resident/fellow. I reviewed the resident/fellow?s documentation and discussed the patient with the resident/fellow.  I agree with the resident/fellow?s medical decision making as documented in the note.     I personally evaluated the patient on 17-Nov-2022   Comments/ Additional Findings    Pending precert for rehab placement.          Electronic Signatures:  Jarvis Deluca (Resident))  (Signed 17-Nov-2022 13:03)   Authored: Service, Subjective Data, Objective Data, Assessment  and Plan, Note Completion  Zainab Sol)  (Signed 17-Nov-2022 15:19)   Authored: Note Completion   Co-Signer: Service, Subjective Data, Objective Data, Assessment and Plan, Note Completion      Last Updated: 17-Nov-2022 15:19 by Zainab Sol)

## 2023-09-14 NOTE — PROGRESS NOTES
Service: Trauma Surgery     Subjective Data:   LINDA TOSCANO is a 32 year old Male who is Hospital Day # 6.     Patient with ongoing nausea and 750cc emesis overnight with RUQ pain. Reports having two bowel movements and urinating appropriately.    Objective Data:     Objective Information:      T   P  R  BP   MAP  SpO2   Value  36.6  57  18  161/93   110  97%  Date/Time 11/15 13:00 11/15 13:00 11/15 13:00 11/15 13:00  11/15 9:31 11/15 13:00  Range  (36C - 37.1C )  (55 - 72 )  (16 - 20 )  (112 - 164 )/ (75 - 93 )  (110 - 110 )  (94% - 98% )  Highest temp of 37.1 C was recorded at 11/15 4:54      Pain reported at 11/15 9:27: 8 = Severe    ---- Intake and Output  -----  Mn/Dy/Year Time  Intake   Output  Net  Nov 15, 2022 2:00 pm  1040   800  240  Nov 15, 2022 6:00 am  0   600  -600  Nov 14, 2022 10:00 pm  0   1575  -1575    The Intake and Output Totals for the last 24 hours are:      Intake   Output  Net      null   3275  null    Physical Exam by System:    Constitutional: NAD, awake and alert   Eyes: EOMI, non-icteric   Head/Neck: Normocephalic, atraumatic   Respiratory/Thorax: Unlabored breathing on room air   Cardiovascular: Regular rate   Gastrointestinal: Soft, nondistended, TTP in RUQ  & LUQ   Genitourinary: Voiding appropriately   Musculoskeletal: LUE, LLE, RLE immobilized and wrapped  in aces   Neurological: A & O x 3, grossly intact   Psychological: Appropriate mood and affect   Skin: Warm and dry, no lesions or rashes     Recent Lab Results:    Results:    CBC: 11/15/2022 09:10              \     Hgb     /                              \     Canceled       /  WBC  ----------------  Plt               Canceled       ----------------    Canceled              /     Hct     \                              /     Canceled       \            RBC: Canceled     MCV: Canceled     Neutrophil %: Canceled      RFP: 11/15/2022 07:45  NA+        Cl-     BUN  /                         137    102    15   /  --------------------------------  Glucose                ---------------------------  110 H    K+     HCO3-   Creat \                         4.2    25    0.84  \  Calcium : 9.6Anion Gap : 14          Albumin : 4.1     Phos : 3.6        I have reviewed these laboratory results:    Complete Blood Count  15-Nov-2022 07:45:00      Result Value    White Blood Cell Count  9.2    Nucleated Erythrocyte Count  0.0    Red Blood Cell Count  4.60    HGB  13.2   L   HCT  39.9   L   MCV  87    MCHC  33.1    PLT  431    RDW-CV  12.5      Renal Function Panel  15-Nov-2022 07:45:00      Result Value    Glucose, Serum  110   H   NA  137    K  4.2    CL  102    Bicarbonate, Serum  25    Anion Gap, Serum  14    BUN  15    CREAT  0.84    GFR Male  >90    Calcium, Serum  9.6    Phosphorus, Serum  3.6    ALB  4.1        Assessment and Plan:   Code Status:  ·  Code Status Full Code     Assessment:    Tobias Paulino is a 25 year old male who fell 25 feet through a roof with a left radial fracture, right subtalar/talonavicular subluxation/dislocation, right intra-articular posterior  talus fracture, and right oblique fracture through lateral malleolus. Patient with nausea and vomiting overnight. RUQ US with no signs of acute cholecystitis    Plan:    Neuo: acute pain  - Tylenol 650 mg Q4HR  - Robaxin 500 mg Q6HR  - Oxy 5/10 Q4HR PRN  - Gabapentin 300mg Q8HR    Cardio  - Vitals Qshift    Respiratory  - IS  - SpO2 >92%    GI  - NPO - > advanced to CLD   - Zofran and Phenergan PRN  - Bowel Regimen - senna, colace      - Monitor I/Os  - IVFs    Musculoskeletal  -NWB b/l LE, and BO anticipate SNF placement  -ex fix to RLE, awaiting orthopedics plans for removal    Heme  - Lovenox 30mg Q12HR    Dispo pending SNF placement - requesting [facility]    Patient seen and discussed with Dr. Alex Webb MD  PGY 1 General Surgery  Trauma Surgery #[id number]      Attestation:   Note Completion:  I am a:  Resident/Fellow   Attending  Attestation I saw and evaluated the patient.  I personally obtained the key and critical portions of the history and physical exam or was physically present for key and  critical portions performed by the resident/fellow. I reviewed the resident/fellow?s documentation and discussed the patient with the resident/fellow.  I agree with the resident/fellow?s medical decision making as documented in the note.     I personally evaluated the patient on 15-Nov-2022   Comments/ Additional Findings    Patient remains in stable condition although is struggling with PO intolerance and RUQ abdominal pain. Ultrasound shows no stones or evidence of  acute inflammation, however the patient has been told in the past that he has gallstones with a prior episode of biliary colic. May need to proceed with HIDA if feasible. Continue symptomatic treatment for now.           Electronic Signatures:  Bacilio Florence (Resident))  (Signed 15-Nov-2022 17:39)   Authored: Service, Subjective Data, Objective Data, Assessment  and Plan, Note Completion  Pennie Johnson)  (Signed 18-Nov-2022 09:18)   Authored: Note Completion   Co-Signer: Service, Subjective Data, Objective Data, Assessment and Plan, Note Completion      Last Updated: 18-Nov-2022 09:18 by Pennie Johnson)

## 2023-09-14 NOTE — PROGRESS NOTES
Service: Orthopaedics     Subjective Data:   Patient is Hospital Day # 2.     Pt extubated overnight.  Plan for OR today.    Objective Data:     Objective Information:      T   P  R  BP   MAP  SpO2   Value  36.9  97  20  122/65   83  92%  Date/Time 11/10 12:13 11/11 4:49 11/11 4:07 11/11 4:49  11/11 4:49 11/11 4:49  Range  (36.9C - 36.9C )  (68 - 105 )  (12 - 25 )  (119 - 168 )/ (65 - 120 )  (78 - 134 )  (91% - 100% )  Highest temp of 36.9 C was recorded at 11/10 12:13      Pain reported at 11/11 4:49: sleeping    Physical Exam Narrative:  ·  Physical Exam:    - Constitutional: Sedated   - Eyes: EOM grossly intact  - Head/Neck: Trachea midline  - Respiratory/Thorax: Intubated  - Cardiovascular: RRR on peripheral palpation  - Gastrointestinal: Nondistended  - Psychological: Appropriate mood/behavior  - Skin: Warm and dry. Additional findings in musculoskeletal evaluation  - Musculoskeletal:  ---  RLE:  - SLS c/d/i  - Compartments soft and compressible  -Motor intact in DF/PF/EHL/FHL  - SILT in Lopez/Sa/SP/DP/T distributions  - 2+ DP pulse    LLE:    -SLS c/d/i  -Motor intact in DF/PF/EHL/FHL  -SILT in saph/sural/SPN/DPN distributions  -Foot warm, well perfused  - 2+ DP pulse    LUE:   -splint intact.   -Motor intact in axillary/AIN/PIN/ulnar distributions  -SILT axillary/radial/median/ulnar distributions  -Hand warm, well perfused  - 2+ radial pulse  -Compartments soft and compressible.    Recent Lab Results:    Results:    Coagulation: 11/11/2022 05:00  PT  /                    13.4  /  -------<    INR          ----------<      1.2 H  PTT\                    31  \                       Assessment and Plan:   Daily Risk Screen:  ·  Does patient have an indwelling urinary catheter? yes   ·  Plan for indwelling urinary catheter removal today? no    ·  The patient continues to require indwelling urinary catheterization for perioperative use for selected surgical procedures     Comorbidities:  ·  Comorbidity obesity    ·  Obesity obesity (BMI 35-39.9)   ·  BMI 37.28     Code Status:  ·  Code Status Full Code     Assessment:    Injury:   R talar head, navicular, and distal fibula fx with associated ST and TN dislocation, L lateral process talus fx, and L distal radius fx  HPI:   25M otherwise healthy presents with above following fall from 25ft ladder at work. Closed, NVI. Intubated and sedated shortly after arrival due to agitation. R ST & TN joints closed reduced. LLE placed in SLS. LUE placed in volar/dorsal slabs. Post reduction  CT shows reduced ST and TN joints.     Plan: C/p R ankle ORIF vs ex-fix, L ankle ORIF  vs CRPP, and L wrist ORIF with Dr. Longo on 11/11. Clear per trauma, please document medical optimization when able.   - Consented and posted to OR schedule 11/11 with Dr. Longo  - NPO for upcoming procedure  - NWB R LE in SLS, NWB L LE in SLS, NWB LUE in dorsal/volar splint  - Hold DVT ppx in the setting of upcoming surgery   - Please obtain pre-op labs (CBC, BMP, Coags, T&S, EKG, CXR, COVID) PRIOR TO TRANSFER TO FLOOR    Consult was discussed with attending, Dr. Donta Steinberg MD  Orthopaedic Surgery PGY1  p. [phone]  Available on [employer]    After 6pm and on weekends please page ortho on-call [phone].    The patient will be followed by the Orthopedic Trauma service. Please page the corresponding resident (below) with questions or concerns.  Ortho Trauma Service: (all available via [employer])  1st call: Carmelo Steinberg MD, PGY1 ([phone])  2nd call: Mukul Camp DO, PGY3  3rd call: Bob Cisse MD, PGY3 ([phone])      Attestation:   Note Completion:  I am a:  Resident/Fellow   Attending Attestation I saw and evaluated the patient.  I personally obtained the key and critical portions of the history and physical exam or was physically present for key and  critical portions performed by the resident/fellow. I reviewed the resident/fellow?s documentation and discussed the patient with the resident/fellow.  I agree with  the resident/fellow?s medical decision making as documented in the note.     I personally evaluated the patient on 11-Nov-2022         Electronic Signatures:  Carmelo Steinberg (Resident))  (Signed 11-Nov-2022 06:34)   Authored: Service, Subjective Data, Objective Data, Assessment  and Plan, Note Completion  Silas Longo)  (Signed 11-Nov-2022 10:32)   Authored: Assessment and Plan, Note Completion   Co-Signer: Service, Subjective Data, Objective Data, Assessment and Plan, Note Completion      Last Updated: 11-Nov-2022 10:32 by Silas Longo)

## 2023-09-14 NOTE — PROGRESS NOTES
"      Service: Trauma Surgery     Subjective Data:   LINDA TOSCANO is a 32 year old Male who is Hospital Day # 7.     NAEO. Seen today on rounds. Reports pain in left extremity and has had \"nothing to eat for three days\".    Objective Data:     Objective Information:      T   P  R  BP   MAP  SpO2   Value  36.5  69  18  165/92   122  97%  Date/Time 11/16 3:49 11/16 3:49 11/16 3:49 11/16 3:49  11/15 18:12 11/16 3:49  Range  (36.3C - 37.1C )  (52 - 69 )  (18 - 20 )  (151 - 168 )/ (88 - 99 )  (110 - 122 )  (94% - 97% )  Highest temp of 37.1 C was recorded at 11/15 4:54      Pain reported at 11/15 21:00: 0 = None    ---- Intake and Output  -----  Mn/Dy/Year Time  Intake   Output  Net  Nov 16, 2022 6:00 am  0   700  -700  Nov 15, 2022 10:00 pm  130   200  -70  Nov 15, 2022 2:00 pm  1040   800  240    The Intake and Output Totals for the last 24 hours are:      Intake   Output  Net      1170   1700  -530    Recent Lab Results:    Results:    CBC: 11/15/2022 09:10              \     Hgb     /                              \     Canceled       /  WBC  ----------------  Plt               Canceled       ----------------    Canceled              /     Hct     \                              /     Canceled       \            RBC: Canceled     MCV: Canceled     Neutrophil %: Canceled      RFP: 11/15/2022 07:45  NA+        Cl-     BUN  /                         137    102    15  /  --------------------------------  Glucose                ---------------------------  110 H    K+     HCO3-   Creat \                         4.2    25    0.84  \  Calcium : 9.6Anion Gap : 14          Albumin : 4.1     Phos : 3.6      Assessment and Plan:   Code Status:  ·  Code Status Full Code     Assessment:    Plan:    Neuo: acute pain  - Tylenol 650 mg Q4HR  - Robaxin 500 mg Q6HR  - Oxy 5/10 Q4HR PRN  - Gabapentin 300mg Q8HR    Cardio  - Vitals Qshift    Respiratory  - IS  - SpO2 >92%    GI  - Advanced to regular diet   - Zofran and Phenergan " PRN  - Bowel Regimen - senna, colace      - Monitor I/Os  - STOP IVF    Musculoskeletal  -NWB b/l LE, and LUE anticipate SNF placement  -ex fix to RLE, awaiting orthopedics plans for removal    Heme  - Lovenox 30mg Q12HR    Dispo pending SNF placement - requesting [facility]    Patient seen and discussed with Dr. Ebenezer Deluca MD  PGY-1  General Surgery  Personal pager [phone], on [facility]      Attestation:   Note Completion:  I am a:  Resident/Fellow   Attending Attestation I saw and evaluated the patient.  I personally obtained the key and critical portions of the history and physical exam or was physically present for key and  critical portions performed by the resident/fellow. I reviewed the resident/fellow?s documentation and discussed the patient with the resident/fellow.  I agree with the resident/fellow?s medical decision making as documented in the note.     I personally evaluated the patient on 16-Nov-2022         Electronic Signatures:  Jarvis Deluca (Resident))  (Signed 16-Nov-2022 18:31)   Authored: Service, Subjective Data, Objective Data, Assessment  and Plan, Note Completion  Zainab Sol)  (Signed 17-Nov-2022 12:23)   Authored: Note Completion   Co-Signer: Service, Subjective Data, Objective Data, Assessment and Plan, Note Completion      Last Updated: 17-Nov-2022 12:23 by Zainab Sol)

## 2023-09-14 NOTE — PROGRESS NOTES
Service: Orthopaedics     Subjective Data:   LINDA TOSCANO is a 32 year old Male who is Hospital Day # 4.     Patient seen and examined at bedside. Pain well controlled, no complaints at this time. Understands NWB LLE, RLE, and LUE restrictions. Denies fever, chills, CP, SOB.    Objective Data:     Objective Information:      T   P  R  BP   MAP  SpO2   Value  36.8  68  18  132/71      97%  Date/Time 11/13 11:48 11/13 11:48 11/13 11:48 11/13 11:48    11/13 11:48  Range  (36C - 36.9C )  (68 - 82 )  (18 - 20 )  (125 - 166 )/ (71 - 91 )    (92% - 99% )  Highest temp of 36.9 C was recorded at 11/12 19:02      Pain reported at 11/13 8:04: 8 = Severe    ---- Intake and Output  -----  Mn/Dy/Year Time  Intake   Output  Net  Nov 12, 2022 2:00 pm  0   600  -600    The Intake and Output Totals for the last 24 hours are:      Intake   Output  Net      null   600  null    Physical Exam Narrative:  ·  Physical Exam:    - Constitutional: No acute distress, cooperative  - Eyes: EOM grossly intact  - Head/Neck: Trachea midline  - Respiratory/Thorax: NWOB  - Cardiovascular: RRR on peripheral palpation  - Gastrointestinal: Nondistended  - Psychological: Appropriate mood/behavior  - Skin: Warm and dry. Additional findings in musculoskeletal evaluation  - Musculoskeletal:  ---  LUE  - volar slab splint and dressings c/d/i  - Gross motor function of the AIN, PIN, radial, ulnar and median nerves intact  - SILT C5-T1  - compartments are soft and compressible    RLE  - AS external fixator intact, pin sites c/d  - SILT L2-S1  - wiggles toes, 3/5 EHL, FHL  - compartments soft and compressible    LLE  - Bulky Erazo splint c/d/i on LLE  - SILT L2-S1  - wiggles toes, 3/5 with EHL, FHL  - compartments are soft and compressible, no pain with passive f/e of toes    Recent Lab Results:    Results:    CBC: 11/13/2022 07:27              \     Hgb     /                              \     11.6 L    /  WBC  ----------------  Plt               9.1        ----------------    334              /     Hct     \                              /     35.7 L    \            RBC: 3.99 L    MCV: 89           RFP: 11/13/2022 07:27  NA+        Cl-     BUN  /                         139    106    9  /  --------------------------------  Glucose                ---------------------------  94    K+     HCO3-   Creat \                         4.0    25    0.86  \  Calcium : 8.7Anion Gap : 12          Albumin : 3.4     Phos : 2.5      Assessment and Plan:   Daily Risk Screen:  ·  Does patient have an indwelling urinary catheter? yes   ·  Plan for indwelling urinary catheter removal today? no   ·  The patient continues to require indwelling urinary catheterization for perioperative use for selected surgical procedures   ·  Does patient have a central line? n/a consulting service     Code Status:  ·  Code Status Full Code     Assessment:    Orthopedic Assessment  1. Closed L distal radius fracture, s/p ORIF L distal radius 11/11  2. Closed L talus lateral process fracture, s/p ORIF L talus on 11/11  3. Closed R talar body, R navicular, R cuboid fractures with subtalar and talonavicular joint dislocation, s/p closed reduction, s/p application of AS ex fix on 11/11      Orthopedic Plan of Care  - WB: NWB LUE, NWB LLE, NWB RLE  - Diet: regular ok per ortho  - VTE ppx: ok to restart per ortho, choice of chemical ppx per primary  - Pain control: per primary team  - Post-op abx: ancef 2g q8h x3  - PT OT  - Ice and elevate  - Maintain splints and dressings     Dispo: Orthopedics to sign off. Follow up with Dr. Longo in clinic at 2 weeks post-op    D/w attending physician Dr Donta Camp DO, PGY3    Please contact ortho pager [phone]) after 6 pm or on weekends    Ortho Trauma Service: (all available via [employer])  1st call: Carmelo Steinberg MD, PGY-1 ([phone])  2nd call: Bob Cisse MD, PGY3 ([phone])      Attestation:   Note Completion:  I am a:  Resident/Fellow   Attending  Attestation I reviewed the resident/fellow?s documentation and discussed the patient with the resident/fellow.  I agree with the resident/fellow?s medical  decision making as documented in the note.         Electronic Signatures:  Mukul Camp)  (Signed 13-Nov-2022 12:44)   Authored: Service, Subjective Data, Objective Data, Assessment  and Plan, Note Completion  Silas Longo)  (Signed 13-Nov-2022 16:47)   Co-Signer: Service, Subjective Data, Objective Data,  Assessment and Plan, Note Completion      Last Updated: 13-Nov-2022 16:47 by Silas Longo)

## 2023-09-14 NOTE — PROGRESS NOTES
Service: Trauma Surgery     Subjective Data:   Patient is Hospital Day # 3.     Pt tolerated OR well. Has complaints of burning pain this morning. Krueger pulled at 0400.    Objective Data:     Objective Information:        T   P  R  BP   MAP  SpO2   Value  36.7  81  20  166/82   83  92%  Date/Time 11/12 4:02 11/12 4:02 11/12 4:02 11/12 4:02  11/11 4:49 11/12 4:02  Range  (36.7C - 36.7C )  (72 - 109 )  (20 - 20 )  (119 - 166 )/ (65 - 90 )  (78 - 106 )  (92% - 99% )         Weights   11/11 17:52: Weight in kg (Weight (kg))  136  11/11 17:52: Weight in lbs ((lbs))  299.8  11/11 17:52: BMI (kg/m2) (BMI (kg/m2))  34.273        Pain reported at 11/11 20:00: 8 = Severe      ---- Intake and Output  -----  Mn/Dy/Year Time  Intake   Output  Net  Nov 12, 2022 6:00 am  0   1350  -1350  Nov 11, 2022 10:00 pm  800   445  355  Nov 11, 2022 2:00 pm  1200   700  500    The Intake and Output Totals for the last 24 hours are:      Intake   Output  Net      2000   2530  -195    ---Intake---   IV Fluids      Procedure IV In: 1200 mL      Infusion: 800 mL      Infusion: 800 mL      Infusion: 800 mL    ---Output---  Urine      Procedure Urine Out: 325 mL      Indwelling Catheter - Urethral: 2120 mL  Blood      Procedure Blood Loss: 50 mL    Physical Exam by System:    Constitutional: well appearing young male in NAD   Eyes: PERRL, EOMI   ENMT: MMM   Head/Neck: NC. AT.   Respiratory/Thorax: CTAB. diminished at bases.   Cardiovascular: RRR, no mgr.   Gastrointestinal: abd s/nt/bd   Genitourinary: deferred   Musculoskeletal: splint to LUE, LLE. ex fix to RLE.  able to wiggle fingers and toes   Extremities: no cyanosis. see MSK.   Neurological: a and ox3, gcs 15   Psychological: appropriate mood and behavior.   Skin: warm and dry     Medication:    Medications:      ANTI-INFECTIVES:    1. ceFAZolin IV Piggy Back:  2  gram(s)  IntraVenous Piggyback  Every 8 Hours      CENTRAL NERVOUS SYSTEM AGENTS:    1. Acetaminophen:  650  mg  Oral   Every 4 Hours    2. oxyCODONE Immediate Release:  5  mg  Oral  Every 4 Hours   PRN       3. oxyCODONE Immediate Release:  10  mg  Oral  Every 4 Hours   PRN       4. oxyCODONE Immediate Release:  5  mg  Oral  Every 4 Hours   PRN       5. Gabapentin:  300  mg  Oral  3 Times a Day    6. Methocarbamol:  500  mg  Oral  Every 6 Hours      COAGULATION MODIFIERS:    1. Enoxaparin SubCutaneous:  30  mg  SubCutaneous  Every 12 Hours      GASTROINTESTINAL AGENTS:    1. Docusate:  100  mg  Oral  2 Times a Day    2. Sennosides:  2  tablet(s)  Oral  At Bedtime      NUTRITIONAL PRODUCTS:    1. Lactated Ringers Infusion:  1000  mL  IntraVenous  <Continuous>      Recent Lab Results:    Results:        I have reviewed these laboratory results:    Complete Blood Count  Trending View      Result 12-Nov-2022 06:13:00  11-Nov-2022 14:53:00    White Blood Cell Count 10.0   8.7    Nucleated Erythrocyte Count 0.0   0.0    Red Blood Cell Count 3.71   L   4.12   L    HGB 10.9   L   11.9   L    HCT 33.1   L   35.8   L    MCV 89   87    MCHC 32.9   33.2       314    RDW-CV 13.0   12.7        Renal Function Panel  11-Nov-2022 14:53:00      Result Value    Glucose, Serum  140   H   NA  140    K  3.8    CL  104    Bicarbonate, Serum  26    Anion Gap, Serum  14    BUN  12    CREAT  0.97    GFR Male  60    Calcium, Serum  8.4   L   Phosphorus, Serum  2.5    ALB  3.6      Magnesium, Serum  11-Nov-2022 14:53:00      Result Value    Magnesium, Serum  2.01      Coagulation Screen  11-Nov-2022 05:00:00      Result Value    Prothrombin Time, Plasma  13.4    International Normalized Ratio, Plasma  1.2   H   Activated Partial Thromboplastin Time  31      Coronavirus 2019, Screen Asymptomatic  10-Nov-2022 18:40:27      Result Value    Fluid Source  Nasal, Nasopharyngeal      Venous Full Panel  Trending View      Result 10-Nov-2022 15:34:00  10-Nov-2022 12:22:00    pH, Venous 7.30   L   7.41    pCO2, Venous 53   H   41    pO2, Venous 60   H   38     SO2, Venous 89   H   69    Bicarbonate, Calculated, Venous 26.1   H   26.0    HGB, Venous 13.6   13.0   L    Anion Gap Level, Venous 6   L   11    Base Excess, Venous -1.1   1.2    Calcium, Ionized Venous 1.24   1.21    Chloride Level 107   106    Glucose Level, Venous 113   H   87    HCT CALCULATED, Venous 41.0   39.0   L    Lactate Level, Venous 0.9   1.3    OXY HGB, Venous 86.1   H   66.9    Patient Temperature, Venous 37.0   37.0    Potassium Level, Venous 3.7   3.9    Sodium Level, Venous 135   L   139          Assessment and Plan:   Daily Risk Screen:  ·  Does patient have an indwelling urinary catheter? yes   ·  Plan for indwelling urinary catheter removal today? yes          Admitting Dx:   Fall on/from ladder, initial encounter: Entered Date: 10-Nov-2022  14:06       Additional Dx:   Ankle fracture: Entered Date: 11-Nov-2022 14:06   Body mass index [BMI] 37.0-37.9, adult: Entered Date: 11-Nov-2022  06:34    Code Status:  ·  Code Status Full Code     Assessment:    25 year old male fell 25 feet after fall, intubated in trauma bay for reduction of R ankle dislocation, aspirated in CT scanner    List of Clinically Significant Injuries/Issues:   -L radial fx  -R subtalar/talonavicular subluxation/dislocation.  -R Intra-articular posterior talus fx  -R Oblique fracture through the lateral malleolus    Plan:  -NWB b/l LE, and LUE anticipate SNF placement  -ex fix to RLE, awaiting orthopedics plans for removal  -ancef x24h  -arora out, f/u void this morning.   -tylenol, oxy 5/10, oxy 5 BT, robaxin, gabapentin  -lovenox  -senna, colace  -PT/OT --> anticipate SNF    20 minutes spent with patient reviewing VSS/medications, obtaining subjective information, performing physical exam, discussing plan of care;  with greater than 50% of that time spent in patient room.     Patient discussed with Dr. Keane.     Jose Sol PA-C  Trauma Surgery  [id number]        Electronic Signatures:  Jose Sol (PAC)  (Signed  12-Nov-2022 08:22)   Authored: Service, Subjective Data, Objective Data, Assessment  and Plan, Note Completion      Last Updated: 12-Nov-2022 08:22 by Jose Sol (PAC)

## 2023-09-14 NOTE — PROGRESS NOTES
Service: Trauma Surgery     Subjective Data:   Patient is Hospital Day # 2.     admitted to floor following operative intervention of b/l ankle fx dislocation and L radius.    Objective Data:     Objective Information:      T   P  R  BP   MAP  SpO2   Value  36.9  97  20  122/65   83  92%  Date/Time 11/10 12:13 11/11 4:49 11/11 4:07 11/11 4:49  11/11 4:49 11/11 4:49  Range  (36.9C - 36.9C )  (68 - 105 )  (12 - 25 )  (119 - 168 )/ (65 - 120 )  (78 - 134 )  (91% - 100% )  Highest temp of 36.9 C was recorded at 11/10 12:13      Pain reported at 11/11 14:00: sleeping    Physical Exam Narrative:  ·  Physical Exam:    GCS 15  Head: No gross skull deformities  Eyes: EOMI  ENT: No nasal bleeding  C spine: No step offs/deformities  Chest: Non tender, no crepitus, equal chest movement  Abdomen: Soft, non distended, non tender, LLQ abrasion   Pelvis: Stable to palpation  Rectal: No gross blood noted  Genitourinary: Normal external genitalia, no blood at the meatus  Extremities: b/l LE in splints. LUE in splint  Back/Spine: no step offs/deformities or tenderness to palpation  Neuro: Grossly normal sensation.      Medication:    Medications:      ANTI-INFECTIVES:    1. ceFAZolin IV Piggy Back:  2  gram(s)  IntraVenous Piggyback  Every 8 Hours    2. ceFAZolin IV Piggy Back:  2  gram(s)  IntraVenous Piggyback  Every 8 Hours    3. ceFAZolin IV Piggy Back:  2  gram(s)  IntraVenous Piggyback  Every 8 Hours      CARDIOVASCULAR AGENTS:    1. Labetalol Injectable:  5  mg  IntraVenous Push  Once   PRN       2. hydrALAZINE (APRESOLINE) Injectable:  5  mg  IntraVenous Push  Every 30 Minutes   PRN         CENTRAL NERVOUS SYSTEM AGENTS:    1. Acetaminophen:  650  mg  Oral  Every 4 Hours   PRN       2. HYDROmorphone Injectable:  0.2  mg  IntraVenous Push  Every 5 Minutes   PRN       3. HYDROmorphone Injectable:  0.4  mg  IntraVenous Push  Every 5 Minutes   PRN       4. oxyCODONE Immediate Release:  5  mg  Oral  Every 4 Hours   PRN        5. oxyCODONE Immediate Release:  10  mg  Oral  Every 4 Hours   PRN       6. Ondansetron Injectable:  4  mg  IntraVenous Push  Once   PRN       7. Promethazine IV Piggy Back:  6.25  mg  IntraVenous Piggyback  Once   PRN       8. Midazolam Injectable:  1  mg  IntraVenous Push  Once   PRN         MISCELLANEOUS AGENTS:    1. Naloxone Injectable:  0.2  mg  IntraVenous Push  Once   PRN         NUTRITIONAL PRODUCTS:    1. Lactated Ringers Infusion:  1000  mL  IntraVenous  <Continuous>      RESPIRATORY AGENTS:    1. Albuterol 2.5 mg/ 3 mL Nebulizer Soln:  3  mL  Inhalation  Once   PRN         Recent Lab Results:    Results:    Coagulation: 11/11/2022 05:00  PT  /                    13.4  /  -------<    INR          ----------<      1.2 H  PTT\                    31  \                       Assessment and Plan:   Daily Risk Screen:  ·  Does patient have an indwelling urinary catheter? yes   ·  Plan for indwelling urinary catheter removal today? yes          Admitting Dx:   Fall on/from ladder, initial encounter: Entered Date: 10-Nov-2022  14:06       Additional Dx:   Ankle fracture: Entered Date: 11-Nov-2022 14:06   Body mass index [BMI] 37.0-37.9, adult: Entered Date: 11-Nov-2022  06:34    Comorbidities:  ·  Comorbidity Other     Code Status:  ·  Code Status Full Code     Assessment:    25 year old male fell 25 feet after fall, intubated in trauma bay for reduction of R ankle dislocation, aspirated in CT scanner    List of Clinically Significant Injuries/Issues:   -L radial fx  -R subtalar/talonavicular subluxation/dislocation.  -R Intra-articular posterior talus fx  -R Oblique fracture through the lateral malleolus    Plan:  -admit to Telemetry floor post operatively.   -NWB b/l LE, and LUE anticipate SNF placement  -ancef x24h  -arora out  -tylenol, oxy 5/10  -lovenox  -senna, colace      Patient discussed with Dr. Keane.     Jose Sol PA-C  Trauma Surgery  [id number]      Electronic Signatures:  Jose Sol  (PAC)  (Signed 12-Nov-2022 07:00)   Authored: Service, Subjective Data, Objective Data, Assessment  and Plan, Note Completion      Last Updated: 12-Nov-2022 07:00 by Jose Sol (PAC)

## 2023-09-14 NOTE — PROGRESS NOTES
Service: Trauma Surgery     Subjective Data:   Patient is Hospital Day # 4.     Pt doing well post op. No over night events. Pain is well controlled. Pt is tolerating diet. Denies difficulty with bm and urination.    Objective Data:     Objective Information:      T   P  R  BP   MAP  SpO2   Value  36.7  76  18  125/76      95%  Date/Time 11/13 8:22 11/13 8:22 11/13 8:22 11/13 8:22    11/13 8:22  Range  (36C - 36.9C )  (73 - 82 )  (18 - 20 )  (125 - 166 )/ (76 - 91 )    (92% - 99% )  Highest temp of 36.9 C was recorded at 11/12 19:02      Pain reported at 11/13 8:04: 8 = Severe    ---- Intake and Output  -----  Mn/Dy/Year Time  Intake   Output  Net  Nov 12, 2022 2:00 pm  0   600  -600    The Intake and Output Totals for the last 24 hours are:      Intake   Output  Net      null   600  null    Physical Exam by System:    Constitutional: well appearing young male in NAD   Eyes: PERRL, EOMI   ENMT: MMM   Head/Neck: NC. AT.   Respiratory/Thorax: CTAB. diminished at bases.   Cardiovascular: RRR, no mgr.   Gastrointestinal: abd s/nt/nd   Genitourinary: deferred   Musculoskeletal: splint to LUE, LLE. ex fix to RLE.  able to wiggle fingers and toes   Extremities: no cyanosis. see MSK.   Neurological: a and ox3, gcs 15   Psychological: appropriate mood and behavior.   Skin: warm and dry     Medication:    Medications:      ANTI-INFECTIVES:    1. ceFAZolin IV Piggy Back:  2  gram(s)  IntraVenous Piggyback  Every 8 Hours      CENTRAL NERVOUS SYSTEM AGENTS:    1. Acetaminophen:  650  mg  Oral  Every 4 Hours    2. oxyCODONE Immediate Release:  5  mg  Oral  Every 4 Hours   PRN       3. oxyCODONE Immediate Release:  10  mg  Oral  Every 4 Hours   PRN       4. oxyCODONE Immediate Release:  5  mg  Oral  Every 4 Hours   PRN       5. Gabapentin:  300  mg  Oral  3 Times a Day    6. Methocarbamol:  500  mg  Oral  Every 6 Hours      COAGULATION MODIFIERS:    1. Enoxaparin SubCutaneous:  30  mg  SubCutaneous  Every 12 Hours       GASTROINTESTINAL AGENTS:    1. Docusate:  100  mg  Oral  2 Times a Day    2. Sennosides:  2  tablet(s)  Oral  At Bedtime      NUTRITIONAL PRODUCTS:    1. Lactated Ringers Infusion:  1000  mL  IntraVenous  <Continuous>      Recent Lab Results:    Results:        I have reviewed these laboratory results:    Complete Blood Count  Trending View      Result 13-Nov-2022 07:27:00  12-Nov-2022 06:13:00  11-Nov-2022 14:53:00    White Blood Cell Count 9.1   10.0   8.7    Nucleated Erythrocyte Count 0.0   0.0   0.0    Red Blood Cell Count 3.99   L   3.71   L   4.12   L    HGB 11.6   L   10.9   L   11.9   L    HCT 35.7   L   33.1   L   35.8   L    MCV 89   89   87    MCHC 32.5   32.9   33.2       309   314    RDW-CV 12.8   13.0   12.7        Renal Function Panel  Trending View      Result 13-Nov-2022 07:27:00  11-Nov-2022 14:53:00    Glucose, Serum 94   140   H       140    K 4.0   3.8       104    Bicarbonate, Serum 25   26    Anion Gap, Serum 12   14    BUN 9   12    CREAT 0.86   0.97    GFR Male 67   60    Calcium, Serum 8.7   8.4   L    Phosphorus, Serum 2.5   2.5    ALB 3.4   3.6          Assessment and Plan:   Code Status:  ·  Code Status Full Code     Assessment:    25 year old male fell 25 feet after fall, intubated in trauma bay for reduction of R ankle dislocation, aspirated in CT scanner    List of Clinically Significant Injuries/Issues:   -L radial fx  -R subtalar/talonavicular subluxation/dislocation.  -R Intra-articular posterior talus fx  -R Oblique fracture through the lateral malleolus    Plan:  -NWB b/l LE, and LUE anticipate SNF placement  -ex fix to RLE, ortho planning 6-8 weeks before removal.  -ancef x24h  -tylenol, oxy 5/10, oxy 5 BT, robaxin, gabapentin  -lovenox  -senna, colace  -PT/OT --> anticipate SNF    20 minutes spent with patient reviewing VSS/medications, obtaining subjective information, performing physical exam, discussing plan of care;  with greater than 50% of that  time spent in patient room.     Patient discussed with Dr. Keane.     Jose Sol PA-C  Trauma Surgery  [id number]        Electronic Signatures:  Jose Sol (PAC)  (Signed 13-Nov-2022 09:55)   Authored: Service, Subjective Data, Objective Data, Assessment  and Plan, Note Completion      Last Updated: 13-Nov-2022 09:55 by Jose Sol (PAC)

## 2023-09-14 NOTE — PROGRESS NOTES
Service: Trauma Surgery     Subjective Data:   LINDA PAULINO is a 32 year old Male who is Hospital Day # 5.     Patient reports tolerable pain, tolerating diet, and no acute overnight events.    Objective Data:     Objective Information:      T   P  R  BP   MAP  SpO2   Value  37  72  18  149/87      95%  Date/Time 11/14 12:24 11/14 12:24 11/14 12:24 11/14 12:24    11/14 12:24  Range  (36C - 37C )  (63 - 76 )  (16 - 18 )  (125 - 165 )/ (71 - 97 )    (95% - 97% )  Highest temp of 37 C was recorded at 11/14 12:24      Pain reported at 11/14 13:39: 7 = Severe    ---- Intake and Output  -----  Mn/Dy/Year Time  Intake   Output  Net  Nov 14, 2022 2:00 pm  0   1100  -1100  Nov 14, 2022 6:00 am  2080   800  1280  Nov 13, 2022 10:00 pm  120   0  120    The Intake and Output Totals for the last 24 hours are:      Intake   Output  Net      2200   800  1400    Physical Exam by System:    Constitutional: NAD, awake and alert   Eyes: EOMI, non-icteric   Head/Neck: Normocephalic, atraumatic   Respiratory/Thorax: Unlabored breathing on room air   Cardiovascular: Regular rate   Gastrointestinal: Soft, nontender, nondistended   Genitourinary: Voiding appropriately   Extremities: LUE, RLE, LLE acewrapped, RLE with external  fixator in place   Neurological: A & O X 3, grossly intact   Psychological: Appropriate mood and affect     Assessment and Plan:   Code Status:  ·  Code Status Full Code     Assessment:    Linda Paulino is a 25 year old male who fell 25 feet through a roof with a left radial fracture, right subtalar/talonavicular subluxation/dislocation, right intra-articular posterior  talus fracture, and right oblique fracture through lateral malleolus.     Plan:    Neuo: acute pain  - Tylenol 650 mg Q4HR  - Robaxin 500 mg Q6HR  - Oxy 5/10 Q4HR PRN  - Gabapentin 300mg Q8HR    Cardio  - Vitals Qshift    Respiratory  - IS  - SpO2 >92%    GI  - Regular diet  - Bowel Regimen - senna, colace      - Monitor  I/Os    Musculoskeletal  -NWB b/l LE, and LUE anticipate SNF placement  -ex fix to RLE, awaiting orthopedics plans for removal    Heme  - Lovenox 30mg Q12HR    Dispo pending SNF placement - requesting [place]    Patient seen and discussed with Dr. Alex Webb MD  PGY 1 General Surgery  Trauma Surgery #[id number]          Attestation:   Note Completion:  I am a:  Resident/Fellow   Attending Attestation I saw and evaluated the patient.  I personally obtained the key and critical portions of the history and physical exam or was physically present for key and  critical portions performed by the resident/fellow. I reviewed the resident/fellow?s documentation and discussed the patient with the resident/fellow.  I agree with the resident/fellow?s medical decision making as documented in the note.     I personally evaluated the patient on 14-Nov-2022   Comments/ Additional Findings    Patient remains in stable condition with no acute events overnight. Endorses adequate pain control. Continue supportive care for now while we proceed  with dispo planning to SNF as patient NWB to three out of four extremities.           Electronic Signatures:  Bacilio Florence (Resident))  (Signed 14-Nov-2022 15:35)   Authored: Service, Subjective Data, Objective Data, Assessment  and Plan, Note Completion  Pennie Johnson)  (Signed 14-Nov-2022 20:41)   Authored: Note Completion   Co-Signer: Service, Subjective Data, Objective Data, Assessment and Plan, Note Completion      Last Updated: 14-Nov-2022 20:41 by Pennie Johnson)

## 2023-09-14 NOTE — PROGRESS NOTES
"      Service: Trauma Surgery     Subjective Data:   LINDA TOSCANO is a 32 year old Male who is Hospital Day # 9.     NAEO. Pleasant mood. Reports he went to the cafeteria yesterday for some sushi and a code brown was called. States it was \"worth it.\" Denies any fever, chills, n/v, change in BM.    Objective Data:     Objective Information:      T   P  R  BP   MAP  SpO2   Value  35.7  68  18  143/81      99%  Date/Time 11/18 3:55 11/18 3:55 11/18 3:55 11/18 3:55    11/18 3:55  Range  (35.5C - 36.3C )  (57 - 102 )  (18 - 18 )  (130 - 152 )/ (62 - 91 )    (96% - 100% )      Pain reported at 11/18 1:27: 6 = Moderate    ---- Intake and Output  -----  Mn/Dy/Year Time  Intake   Output  Net  Nov 18, 2022 6:00 am  0   0  0  Nov 17, 2022 2:00 pm  0   950  -950    The Intake and Output Totals for the last 24 hours are:      Intake   Output  Net      null   950  null    Physical Exam Narrative:  ·  Physical Exam:    Constitutional: No acute distress  Neurological: Awake, alert, conversive  Cardiovascular: no acute issues  Head/Neck: NCAT  Respiratory/Thorax: even, unlabored, breathing regularly on RA  Gastrointestinal: soft nontender, nondistended  Skin: warm, dry  Musculoskeletal: TRAORE  Eyes: non-icteric  Extremities: wraped in ace bandages, able to move spontaneously   Psychological: appropriate mood/affect      Recent Lab Results:    Results:    CBC: 11/17/2022 07:22              \     Hgb     /                              \     13.0 L    /  WBC  ----------------  Plt               9.1       ----------------    385              /     Hct     \                              /     38.4 L    \            RBC: 4.47 L    MCV: 86           RFP: 11/17/2022 07:22  NA+        Cl-     BUN  /                         139    104    17  /  --------------------------------  Glucose                ---------------------------  86    K+     HCO3-   Creat \                         4.7    25    0.88  \  Calcium : 9.7Anion Gap : 15       "    Albumin : 3.8     Phos : 4.2      Assessment and Plan:   Code Status:  ·  Code Status Full Code     Assessment:    Plan:    Neuo: acute pain  - Tylenol 650 mg Q4HR  - Robaxin 500 mg Q6HR  - Oxy 5/10 Q4HR PRN  - Gabapentin 300mg Q8HR    Cardio  - Vitals Qshift    Respiratory  - IS  - SpO2 >92%    GI  - CONTINUE regular diet   - Zofran and Phenergan PRN  - Bowel Regimen - senna, colace      - Monitor I/Os    Musculoskeletal  -NWB b/l LE, and LUE anticipate SNF placement  -ex fix to RLE, awaiting orthopedics plans for removal    Heme  - Lovenox 30mg Q12HR    Dispo: SNF approved. discharge    Patient seen and discussed with Dr. Ebenezer Deluca MD  PGY-1  General Surgery  Personal pager [phone], on [facility]      Attestation:   Note Completion:  I am a:  Resident/Fellow   Attending Attestation I saw and evaluated the patient.  I personally obtained the key and critical portions of the history and physical exam or was physically present for key and  critical portions performed by the resident/fellow. I reviewed the resident/fellow?s documentation and discussed the patient with the resident/fellow.  I agree with the resident/fellow?s medical decision making as documented in their note  with the exception/addition of the following:    I personally evaluated the patient on 18-Nov-2022   Comments/ Additional Findings    I evaluated and examined the patient with the surgical team. I agree with the above findings, assessment and plan.          Electronic Signatures:  Jarvis Deluca (Resident))  (Signed 18-Nov-2022 19:08)   Authored: Service, Subjective Data, Objective Data, Assessment  and Plan, Note Completion  Jordan Keane)  (Signed 18-Nov-2022 23:45)   Authored: Note Completion   Co-Signer: Service, Subjective Data, Objective Data, Assessment and Plan, Note Completion      Last Updated: 18-Nov-2022 23:45 by Jordan Keane)

## 2024-03-06 NOTE — CONSULTS
Service:   Service: Orthopaedics     Consult:  Consult requested by (Attending Name): ED   Reason: R talar dislocation     History of Present Illness:   HPI:    Injury:   R talar head, navicular, and distal fibula fx with associated ST and TN dislocation, L lateral process talus fx, and L distal radius fx  HPI:   25M otherwise healthy presents with above following fall from 25ft ladder at work. Closed, NVI. Intubated and sedated shortly after arrival due to agitation.    PMH: per above/EMR  PSH: per above/EMR  SocHx:      - Unable to obtain 2/2 patient condition  FamHx:  Non-contributory to this patient's acute orthopaedic problem other than as mentioned in HPI  All: Reviewed in EMR  Meds: Reviewed in EMR    ROS      - 14 point ROS negative except as above      Objective:   Physical Exam Narrative:  ·  Physical Exam:    - Constitutional: Sedated   - Eyes: EOM grossly intact  - Head/Neck: Trachea midline  - Respiratory/Thorax: Intubated  - Cardiovascular: RRR on peripheral palpation  - Gastrointestinal: Nondistended  - Psychological: Appropriate mood/behavior  - Skin: Warm and dry. Additional findings in musculoskeletal evaluation  - Musculoskeletal:  ---  RLE:  - Closed injury  - Compartments soft and compressible  -Motor intact in DF/PF/EHL/FHL  - SILT in Lopez/Sa/SP/DP/T distributions  - 2+ DP pulse    LLE:    -Skin intact  -Motor intact in DF/PF/EHL/FHL  -SILT in saph/sural/SPN/DPN distributions  -Foot warm, well perfused  - 2+ DP pulse    LUE:   -Skin intact.   -Motor intact in axillary/AIN/PIN/ulnar distributions  -SILT axillary/radial/median/ulnar distributions  -Hand warm, well perfused  - 2+ radial pulse  -Compartments soft and compressible.      Assessment:    Injury:   R talar head, navicular, and distal fibula fx with associated ST and TN dislocation, L lateral process talus fx, and L distal radius fx  HPI:   25M otherwise healthy presents with above following fall from 25ft ladder at work. Closed, NVI.  Intubated and sedated shortly after arrival due to agitation. R ST & TN joints closed reduced. LLE placed in SLS. LUE placed in volar/dorsal slabs. Post reduction  CT shows reduced ST and TN joints.     Plan: C/p R ankle ORIF vs ex-fix, L ankle ORIF  vs CRPP, and L wrist ORIF with Dr. Longo on 11/11. Pending clearance.   - Inpatient admission required, dispo per ED  - Consented and posted to OR schedule 11/11 with Dr. Longo  - NPO for upcoming procedure  - NWB R LE in SLS, NWB L LE in SLS, NWB LUE in dorsal/volar splint  - Hold DVT ppx in the setting of upcoming surgery   - Patient will need a secondary exam once extubated  - Please obtain pre-op labs (CBC, BMP, Coags, T&S, EKG, CXR, COVID) PRIOR TO TRANSFER TO FLOOR    Consult was discussed with attending, Dr. Donta Cisse MD PGY3  Orthopaedic Surgery On-Call Resident  On-call pager: [phone] (For weekdays after 6PM & weekends)    The patient will be followed by the Orthopedic Trauma service. Please page the corresponding resident (below) with questions or concerns.  Ortho Trauma Service: (all available via [employer])  1st call: Carmelo Steinberg MD, PGY1 ([phone])  2nd call: Mukul Camp DO, PGY3  3rd call: Bob Cisse MD, PGY3 ([phone])      Attestation:   Note Completion:  I am a:  Resident/Fellow   Attending Attestation I saw and evaluated the patient.  I personally obtained the key and critical portions of the history and physical exam or was physically present for key and  critical portions performed by the resident/fellow. I reviewed the resident/fellow?s documentation and discussed the patient with the resident/fellow.  I agree with the resident/fellow?s medical decision making as documented in the note.     I personally evaluated the patient on 11-Nov-2022         Electronic Signatures:  Alberto Cisse (Resident))  (Signed 10-Nov-2022 14:32)   Authored: Service, History of Present Illness, Objective,  Assessment/Recommendations  Silas Longo)   (Signed 11-Nov-2022 10:29)   Authored: Note Completion   Co-Signer: Service, History of Present Illness, Objective, Assessment/Recommendations, Note Completion  Emilia Martinez (Resident))  (Signed 10-Nov-2022 19:49)   Authored: Service, History of Present Illness, Objective,  Assessment/Recommendations, Note Completion      Last Updated: 11-Nov-2022 10:29 by Silas Longo)

## 2024-04-19 DIAGNOSIS — F90.8 ATTENTION-DEFICIT HYPERACTIVITY DISORDER, OTHER TYPE: Primary | ICD-10-CM

## 2024-04-19 DIAGNOSIS — E55.9 VITAMIN D DEFICIENCY, UNSPECIFIED: ICD-10-CM

## 2024-04-19 DIAGNOSIS — E66.8 OTHER OBESITY: ICD-10-CM

## 2024-04-19 DIAGNOSIS — R53.83 OTHER FATIGUE: Primary | ICD-10-CM

## 2024-04-19 DIAGNOSIS — E78.49 OTHER HYPERLIPIDEMIA: ICD-10-CM

## 2024-04-19 NOTE — OP NOTE
PROCEDURE DETAILS    Preoperative Diagnosis:  Right closed subtalar joint dislocation, talar head and posterior process fracture, navicular fracture    Left closed extraarticular distal radius fracture, lateral process of the talus fracture     Postoperative Diagnosis:  Right closed subtalar joint dislocation, talar head and posterior process fracture, navicular fracture    Left closed extraarticular distal radius fracture, lateral process of the talus fracture     Surgeon: Dimitri Longo MD  Resident/Fellow/Other Assistant: Katherine PGY-5, Conrado PGY-3    Procedure:  1. Closed reduction of right subtalar joint dislocation   2. Closed management of talar head fracture   3. Closed management of posterior talar process fracture   4. Closed management of navicular fracture   5. Application of multiplanar ankle-spanning external fixator   6. Open treatment of left lateral talar process fracture   7. Open treatment of left extraarticular distal radius fracture   8. Extensor tendon tenotomy, brachioradialis  closed management of distal fibula    Anesthesia: GETA  Estimated Blood Loss: 75  Findings: see op note   Complications: None apparent   Additional Details: NWB RLE in ex-fix  NWB LUE in volar slap splint   NWB LLE in short leg splint  Patient Returned To/Condition: PACU in stable condition         Operative Report:   PREOPERATIVE DIAGNOSIS:     Right closed subtalar joint dislocation, talar head and posterior process fractures, navicular fracture    Left closed extraarticular distal radius fracture, lateral process of the talus fracture      POSTOPERATIVE DIAGNOSIS:    Right closed subtalar joint dislocation, talar head and posterior process fracture, navicular fracture    Left closed extraarticular distal radius fracture, lateral process of the talus fracture     OPERATION/PROCEDURE:    1. Closed reduction of right subtalar joint dislocation   2. Closed management of talar head fracture   3. Closed management of  posterior talar process fracture   4. Closed management of navicular fracture   5. Application of multiplanar ankle-spanning external fixator   6. Open treatment of left lateral talar process fracture   7. Open treatment of left extraarticular distal radius fracture   8. Extensor tenotomy, brachioradialis tendon   closed distal fibula fracture right     SURGEON:    Dimitri Longo MD     ASSISTANT(S):    1.  Vasu Murphy MD PGY-5  2.  Mukul Camp DO PGY-3     ANESTHESIA:    General.     ESTIMATED BLOOD LOSS:    About 75 cc      INDICATION:    The patient is a 25 year old male who fell off a ladder about 25 feet and presented as a trauma to [facility]. He was intubated in the trauma bay. He was hemodynamically stable. Imaging demonstrated numerus closed orthopedic injuries including a closed right  subtalar dislocation and a closed left distal radius fracture. On secondary survey he was found to have a fracture of his left lateral talar process and numerus fractures about his right hindfoot. Sedation and intubation was performed in the ED to allow  for a closed reduction of the right subtalar dislocation. This was performed successfully by the orthopedic on call team. We was placed in a short leg splint. His left wrist was placed in a volar slab splint. He did have an aspiration event in the CT  scanner so he remained intubated over night and was admitted to the trauma service overnight. He was cleared for operative intervention for the aforementioned procedures. We would ex-fix the right ankle to provide stability to the subtalar joint and for  closed management of the fractures about the subtalar joint. We would perform percutaneous fixation of the left lateral process of the talus to facilitate healing and early ROM of the left lower extremity. We would performed fixation of the left distal  radius to facilitate faster recovery and earlier initiation of ROM as well. The risks, benefits, and alternatives to surgery were  discussed.  The patient's family understood and wished to proceed as they believed the procedures to be in the patient's  best interest as he was not able to consent for the surgeries.      DESCRIPTION OF PROCEDURE:    We proceeded to the operating room.  Appropriate time-out was performed.  General anesthesia was initiated by the Anesthesia team. The patient was transferred to the operating table and placed in a supine position.  All bony prominences were adequately  padded.  The patient was prepped and draped in the usual sterile fashion with the bilateral lower extremities and the left upper extremity prepped and draped.  The patient received preoperative antibiotics and TXA. We started with the right lower extremity.  The subtalar joint appeared nearly reduced on AP and lateral views. We performed a manual reduction of the subtalar joint and confirmed adequate reduction of AP and lateral views. We started with our tibial half-pins for our [employer] ankle spanning  external fixator. We placed two half pins in the tibia at as relatively closed distance to the ankle joint to increase our stability to the subtalar joint. We then placed two calcaneal pins and confirmed position on X-ray. We then placed two pins into  the first metatarsal. We placed a towel bump under the tibia to assist in our closed reduction. We took AP, lateral, Jose, and whiting axial views and were satisfied with the reduction of the subtalar joint and the fractures of the talus and the navicular.  We would be managing these fractures in a closed manner in the external fixator. We then tightened the proximal ex-fix bars and the subsequently the distal bars. We then placed bars and pin to bar connectors to modulate the dorsiflexion through the pins  in the first metatarsal. This concluded our multiplanar external fixator. We then turned attention to the contralateral foot and the lateral process of the talus. We took a lateral x-ray to maria  the stab incision for our percutaneous fixation. We then  took an AP and Jose view to localize the start point. The guidewire was advanced into the lateral process and then into the center of the talar body. We measured the length of the screw and selected one 4 mm shorter than what was measured. We then placed  a single 3.5 mm x 34 mm headless compression screw into the lateral process. There was great compression generated with this screw. We took final views and then copiously irrigated the skin incision. This was closed with 2-0 nylon suture. We then turned  attention to the left distal radius. We raised a nonsterile tourniquet to 250 mm Hg. We marked the incision for a modified volar approach of sonya. We incised the skin and coagulated skin bleeders. We then incised the sheath over FCR. We retracted FCR  ulnarly. We incised the sub-fascia. We pushed the FPL ulnarly and exposed PQ. We sharply dissected PQ off the radial border and made a transverse peel at the level of the watershed line. We performed a tenotomy of the brachioradialis tendon to improve  our reduction of the fracture. We did this with a 15 blade and sharply dissected this off the radial border of the radius. We exposed the fracture site and debrided the fracture hematoma. The fracture was visualized on biplanar films and was in excellent  alignment. We then placed a single k-wire from the radial tuberosity into the radial shaft which held the reduction in place. We then sized a volar distal radius plate by Sk[employer]heet, 3 hole by 6 hole. We pinned this in place and inspected our positioning.  We then proceeded to place a cortical screw in the distal row to bring the plate down to bone. We then placed 3 additional locking screws in a unicortical manner. These were inspected on AP and lateral views and were noted to be out of the joint and of  appropriate length. We then placed two cortical screws in the shaft to bring the plate down to bone and  generate some additional volar tilt at the fracture. We then replaced the cortical screw for a locking screw in the distal cluster. Final views were  taken and demonstrated excellent alignment. We then copiously irrigated and placed tobramycin and vancomycin powder in the  surgical wounds. We then closed the wounds in a layered fashion first by reapproximating the PQ and then closing the skin with  2-0 monocryl and then running subcuticular 4-0 monocryl.  The patient was then placed in a volar slab splint. The left lower was placed in a short leg splint. The patient was awoken from general anesthesia and extubated. Patient was transferred to the  PACU in stable condition without complication.    Dimitri Longo MD, was present for all critical portions of the case.     POSTOPERATIVE PLAN:   The patient will be readmitted to the trauma service. He will receive 24 hours of perioperative antibiotics. He will be nonweightbearing on the right lower extremity and left lower extremity. He will be non weight bearing on the left upper extremity.  The trauma team may restart him on DVT chemoppx today. He will follow up in 3 weeks with AP and lateral of the left wrist, 3 views left ankle, 3 views right ankle, 3 views right foot   Vasu Murphy MD, on behalf of Dimitri Longo MD.                            Attestation:   Note Completion:  Attending Attestation I was present for key portions of the procedure and the procedure lasted longer than 5 minutes.    I am a: Resident/Fellow         Electronic Signatures:  Vasu Murphy (Resident))  (Signed 11-Nov-2022 13:25)   Authored: Post-Operative Note, Chart Review, Note Completion  Silas Longo)  (Signed 12-Nov-2022 11:21)   Authored: Post-Operative Note, Chart Review, Note Completion   Co-Signer: Post-Operative Note, Chart Review, Note Completion      Last Updated: 12-Nov-2022 11:21 by Silas Longo)

## 2024-05-13 ENCOUNTER — LAB (OUTPATIENT)
Dept: LAB | Facility: LAB | Age: 34
End: 2024-05-13
Payer: COMMERCIAL

## 2024-05-13 DIAGNOSIS — F90.8 ATTENTION-DEFICIT HYPERACTIVITY DISORDER, OTHER TYPE: ICD-10-CM

## 2024-05-13 DIAGNOSIS — E66.8 OTHER OBESITY: ICD-10-CM

## 2024-05-13 LAB
ALBUMIN SERPL BCP-MCNC: 4.5 G/DL (ref 3.4–5)
ALP SERPL-CCNC: 55 U/L (ref 33–120)
ALT SERPL W P-5'-P-CCNC: 33 U/L (ref 10–52)
ANION GAP SERPL CALC-SCNC: 14 MMOL/L (ref 10–20)
AST SERPL W P-5'-P-CCNC: 35 U/L (ref 9–39)
BASOPHILS # BLD AUTO: 0.08 X10*3/UL (ref 0–0.1)
BASOPHILS NFR BLD AUTO: 1.2 %
BILIRUB SERPL-MCNC: 0.5 MG/DL (ref 0–1.2)
BUN SERPL-MCNC: 22 MG/DL (ref 6–23)
CALCIUM SERPL-MCNC: 9.8 MG/DL (ref 8.6–10.3)
CHLORIDE SERPL-SCNC: 102 MMOL/L (ref 98–107)
CO2 SERPL-SCNC: 27 MMOL/L (ref 21–32)
CREAT SERPL-MCNC: 1.13 MG/DL (ref 0.5–1.3)
EGFRCR SERPLBLD CKD-EPI 2021: 88 ML/MIN/1.73M*2
EOSINOPHIL # BLD AUTO: 0.22 X10*3/UL (ref 0–0.7)
EOSINOPHIL NFR BLD AUTO: 3.2 %
ERYTHROCYTE [DISTWIDTH] IN BLOOD BY AUTOMATED COUNT: 13.9 % (ref 11.5–14.5)
GLUCOSE SERPL-MCNC: 78 MG/DL (ref 74–99)
HCT VFR BLD AUTO: 41.2 % (ref 41–52)
HGB BLD-MCNC: 13.3 G/DL (ref 13.5–17.5)
IMM GRANULOCYTES # BLD AUTO: 0.01 X10*3/UL (ref 0–0.7)
IMM GRANULOCYTES NFR BLD AUTO: 0.1 % (ref 0–0.9)
LYMPHOCYTES # BLD AUTO: 2.62 X10*3/UL (ref 1.2–4.8)
LYMPHOCYTES NFR BLD AUTO: 38.5 %
MCH RBC QN AUTO: 28.8 PG (ref 26–34)
MCHC RBC AUTO-ENTMCNC: 32.3 G/DL (ref 32–36)
MCV RBC AUTO: 89 FL (ref 80–100)
MONOCYTES # BLD AUTO: 0.69 X10*3/UL (ref 0.1–1)
MONOCYTES NFR BLD AUTO: 10.1 %
NEUTROPHILS # BLD AUTO: 3.18 X10*3/UL (ref 1.2–7.7)
NEUTROPHILS NFR BLD AUTO: 46.9 %
NRBC BLD-RTO: 0 /100 WBCS (ref 0–0)
PLATELET # BLD AUTO: 314 X10*3/UL (ref 150–450)
POTASSIUM SERPL-SCNC: 3.9 MMOL/L (ref 3.5–5.3)
PROT SERPL-MCNC: 7.5 G/DL (ref 6.4–8.2)
RBC # BLD AUTO: 4.62 X10*6/UL (ref 4.5–5.9)
SODIUM SERPL-SCNC: 139 MMOL/L (ref 136–145)
TSH SERPL-ACNC: 1.85 MIU/L (ref 0.44–3.98)
WBC # BLD AUTO: 6.8 X10*3/UL (ref 4.4–11.3)

## 2024-05-13 PROCEDURE — 82306 VITAMIN D 25 HYDROXY: CPT

## 2024-05-13 PROCEDURE — 82607 VITAMIN B-12: CPT

## 2024-05-13 PROCEDURE — 84443 ASSAY THYROID STIM HORMONE: CPT

## 2024-05-13 PROCEDURE — 80053 COMPREHEN METABOLIC PANEL: CPT

## 2024-05-13 PROCEDURE — 85025 COMPLETE CBC W/AUTO DIFF WBC: CPT

## 2024-05-13 PROCEDURE — 36415 COLL VENOUS BLD VENIPUNCTURE: CPT

## 2024-05-13 PROCEDURE — 82746 ASSAY OF FOLIC ACID SERUM: CPT

## 2024-05-14 LAB
25(OH)D3 SERPL-MCNC: 33 NG/ML (ref 30–100)
FOLATE SERPL-MCNC: >24 NG/ML
VIT B12 SERPL-MCNC: 638 PG/ML (ref 211–911)

## 2024-07-14 ENCOUNTER — HOSPITAL ENCOUNTER (EMERGENCY)
Facility: HOSPITAL | Age: 34
Discharge: HOME | End: 2024-07-14
Payer: COMMERCIAL

## 2024-07-14 VITALS
HEIGHT: 78 IN | BODY MASS INDEX: 33.41 KG/M2 | SYSTOLIC BLOOD PRESSURE: 147 MMHG | RESPIRATION RATE: 20 BRPM | WEIGHT: 288.8 LBS | OXYGEN SATURATION: 97 % | DIASTOLIC BLOOD PRESSURE: 82 MMHG | HEART RATE: 65 BPM | TEMPERATURE: 98.6 F

## 2024-07-14 DIAGNOSIS — R60.0 EDEMA OF UPPER EXTREMITY: ICD-10-CM

## 2024-07-14 DIAGNOSIS — R21 ACUTE ERUPTION OF SKIN: ICD-10-CM

## 2024-07-14 DIAGNOSIS — L53.9 ERYTHEMA OF UPPER EXTREMITY: Primary | ICD-10-CM

## 2024-07-14 PROCEDURE — 2500000001 HC RX 250 WO HCPCS SELF ADMINISTERED DRUGS (ALT 637 FOR MEDICARE OP): Performed by: PHYSICIAN ASSISTANT

## 2024-07-14 PROCEDURE — 96372 THER/PROPH/DIAG INJ SC/IM: CPT | Performed by: PHYSICIAN ASSISTANT

## 2024-07-14 PROCEDURE — 2500000004 HC RX 250 GENERAL PHARMACY W/ HCPCS (ALT 636 FOR OP/ED): Performed by: PHYSICIAN ASSISTANT

## 2024-07-14 PROCEDURE — 99283 EMERGENCY DEPT VISIT LOW MDM: CPT

## 2024-07-14 RX ORDER — KETOROLAC TROMETHAMINE 30 MG/ML
30 INJECTION, SOLUTION INTRAMUSCULAR; INTRAVENOUS ONCE
Status: COMPLETED | OUTPATIENT
Start: 2024-07-14 | End: 2024-07-14

## 2024-07-14 RX ORDER — CLINDAMYCIN HYDROCHLORIDE 300 MG/1
300 CAPSULE ORAL ONCE
Status: COMPLETED | OUTPATIENT
Start: 2024-07-14 | End: 2024-07-14

## 2024-07-14 RX ORDER — PREDNISONE 20 MG/1
20 TABLET ORAL 2 TIMES DAILY
Qty: 10 TABLET | Refills: 0 | Status: SHIPPED | OUTPATIENT
Start: 2024-07-14 | End: 2024-07-19

## 2024-07-14 RX ORDER — CLINDAMYCIN HYDROCHLORIDE 300 MG/1
300 CAPSULE ORAL 4 TIMES DAILY
Qty: 40 CAPSULE | Refills: 0 | Status: SHIPPED | OUTPATIENT
Start: 2024-07-14 | End: 2024-07-24

## 2024-07-14 RX ORDER — DEXAMETHASONE 6 MG/1
12 TABLET ORAL ONCE
Status: COMPLETED | OUTPATIENT
Start: 2024-07-14 | End: 2024-07-14

## 2024-07-14 ASSESSMENT — PAIN - FUNCTIONAL ASSESSMENT: PAIN_FUNCTIONAL_ASSESSMENT: 0-10

## 2024-07-14 ASSESSMENT — PAIN SCALES - GENERAL: PAINLEVEL_OUTOF10: 0 - NO PAIN

## 2024-07-14 ASSESSMENT — COLUMBIA-SUICIDE SEVERITY RATING SCALE - C-SSRS
2. HAVE YOU ACTUALLY HAD ANY THOUGHTS OF KILLING YOURSELF?: NO
1. IN THE PAST MONTH, HAVE YOU WISHED YOU WERE DEAD OR WISHED YOU COULD GO TO SLEEP AND NOT WAKE UP?: NO
6. HAVE YOU EVER DONE ANYTHING, STARTED TO DO ANYTHING, OR PREPARED TO DO ANYTHING TO END YOUR LIFE?: NO

## 2024-07-14 NOTE — ED PROVIDER NOTES
HPI   Chief Complaint   Patient presents with    Arm Injury     Left arm pain and swelling, no injury pain at elbow       34-year-old male presented emergency department with a chief complaint of pain and swelling in his left elbow.  Denies any specific injury.  Had bursitis of the elbow previously.  He is concerned this may also represent a cellulitic infection.  He denies difficulty with range of motion of the elbow.  He is well-appearing nontoxic.  Denies lightheadedness dizziness.  No other complaint.                          [place] Coma Scale Score: 15                     Patient History   No past medical history on file.  No past surgical history on file.  No family history on file.  Social History     Tobacco Use    Smoking status: Not on file    Smokeless tobacco: Not on file   Substance Use Topics    Alcohol use: Not on file    Drug use: Not on file       Physical Exam   ED Triage Vitals [07/14/24 1858]   Temperature Heart Rate Respirations BP   37 °C (98.6 °F) 65 20 147/82      Pulse Ox Temp Source Heart Rate Source Patient Position   97 % Oral Monitor Sitting      BP Location FiO2 (%)     Right arm --       Physical Exam  Vitals and nursing note reviewed.   Constitutional:       Appearance: Normal appearance.   HENT:      Head: Normocephalic.      Nose: Nose normal.      Mouth/Throat:      Mouth: Mucous membranes are moist.   Cardiovascular:      Rate and Rhythm: Normal rate.   Pulmonary:      Effort: Pulmonary effort is normal.   Abdominal:      General: Abdomen is flat.   Musculoskeletal:      Cervical back: Normal range of motion.      Comments: Tenderness with mild edema to the left elbow, able to flex and extend, able to pronate and supinate   Skin:     General: Skin is warm.      Comments: Mild warmth overlying olecranon   Neurological:      General: No focal deficit present.      Mental Status: He is alert and oriented to person, place, and time.         ED Course & MDM   Diagnoses as of 07/20/24  1800   Erythema of upper extremity   Edema of upper extremity   Acute eruption of skin       Medical Decision Making  I have seen and evaluated this patient.  Physician available for consultation.  Vital signs have been reviewed.  All laboratory and diagnostic imaging is reviewed by myself and interpreted by myself unless otherwise stated.  Additionally imaging is interpreted by radiologist    Is consistent with bursitis.  Potentially could represent cellulitis.  Will treat concomitantly for both.  No evidence of a septic arthritis.  Released in good condition with symptomatic treatment.    Labs Reviewed - No data to display  No orders to display  Medications  dexAMETHasone (Decadron) tablet 12 mg (12 mg oral Given 7/14/24 1955)  ketorolac (Toradol) injection 30 mg (30 mg intramuscular Given 7/14/24 1955)   clindamycin (Cleocin) capsule 300 mg (300 mg oral Given 7/14/24 1955)  Discharge Medication List as of 7/14/2024  8:03 PM    START taking these medications    clindamycin (Cleocin) 300 mg capsule  Take 1 capsule (300 mg) by mouth 4 times a day for 10 days., Starting Sun 7/14/2024, Until Wed 7/24/2024, Normal    predniSONE (Deltasone) 20 mg tablet  Take 1 tablet (20 mg) by mouth 2 times a day for 5 days., Starting Sun 7/14/2024, Until Fri 7/19/2024, Normal                  Procedure  Procedures     Bogdan Sanchez PA-C  07/20/24 8612

## 2024-07-14 NOTE — Clinical Note
Tobias Paulino was seen and treated in our emergency department on 7/14/2024.  He may return to work on 07/16/2024.       If you have any questions or concerns, please don't hesitate to call.      Bogdan Sanchez PA-C

## 2024-07-14 NOTE — DISCHARGE INSTRUCTIONS
You were seen by Bogdan Sanchez    Please take antibiotics and steroids as prescribed, you should notice improvement within 48 hours.

## 2024-07-22 ENCOUNTER — HOSPITAL ENCOUNTER (EMERGENCY)
Facility: HOSPITAL | Age: 34
Discharge: HOME | End: 2024-07-22
Payer: COMMERCIAL

## 2024-07-22 VITALS
HEIGHT: 78 IN | SYSTOLIC BLOOD PRESSURE: 145 MMHG | TEMPERATURE: 98.2 F | OXYGEN SATURATION: 100 % | BODY MASS INDEX: 32.9 KG/M2 | HEART RATE: 74 BPM | WEIGHT: 284.39 LBS | DIASTOLIC BLOOD PRESSURE: 76 MMHG | RESPIRATION RATE: 18 BRPM

## 2024-07-22 DIAGNOSIS — R21 RASH: Primary | ICD-10-CM

## 2024-07-22 DIAGNOSIS — L25.9 CONTACT DERMATITIS, UNSPECIFIED CONTACT DERMATITIS TYPE, UNSPECIFIED TRIGGER: ICD-10-CM

## 2024-07-22 PROCEDURE — 2500000001 HC RX 250 WO HCPCS SELF ADMINISTERED DRUGS (ALT 637 FOR MEDICARE OP): Performed by: PHYSICIAN ASSISTANT

## 2024-07-22 PROCEDURE — 99283 EMERGENCY DEPT VISIT LOW MDM: CPT

## 2024-07-22 PROCEDURE — 2500000004 HC RX 250 GENERAL PHARMACY W/ HCPCS (ALT 636 FOR OP/ED): Performed by: PHYSICIAN ASSISTANT

## 2024-07-22 PROCEDURE — 96372 THER/PROPH/DIAG INJ SC/IM: CPT | Performed by: PHYSICIAN ASSISTANT

## 2024-07-22 RX ORDER — FAMOTIDINE 20 MG/1
20 TABLET, FILM COATED ORAL ONCE
Status: COMPLETED | OUTPATIENT
Start: 2024-07-22 | End: 2024-07-22

## 2024-07-22 RX ORDER — PREDNISONE 20 MG/1
40 TABLET ORAL DAILY
Qty: 10 TABLET | Refills: 0 | Status: SHIPPED | OUTPATIENT
Start: 2024-07-22 | End: 2024-07-27

## 2024-07-22 ASSESSMENT — COLUMBIA-SUICIDE SEVERITY RATING SCALE - C-SSRS
1. IN THE PAST MONTH, HAVE YOU WISHED YOU WERE DEAD OR WISHED YOU COULD GO TO SLEEP AND NOT WAKE UP?: NO
2. HAVE YOU ACTUALLY HAD ANY THOUGHTS OF KILLING YOURSELF?: NO
6. HAVE YOU EVER DONE ANYTHING, STARTED TO DO ANYTHING, OR PREPARED TO DO ANYTHING TO END YOUR LIFE?: NO

## 2024-07-22 ASSESSMENT — PAIN SCALES - GENERAL: PAINLEVEL_OUTOF10: 0 - NO PAIN

## 2024-07-22 ASSESSMENT — PAIN - FUNCTIONAL ASSESSMENT: PAIN_FUNCTIONAL_ASSESSMENT: 0-10

## 2024-07-22 ASSESSMENT — PAIN DESCRIPTION - PROGRESSION: CLINICAL_PROGRESSION: NOT CHANGED

## 2024-07-22 NOTE — ED PROVIDER NOTES
HPI   Chief Complaint   Patient presents with    Rash     Pt has poison ivy all over body       HPI  The patient 34-year-old male here for evaluation of possible rash, he suggest possible poison ivy.  He was seen here about a week ago for left elbow issues, given clindamycin and a small supply of prednisone, he says that since then he has developed this rash over the majority of his body is very itchy, and he believes that it is poison ivy.  He does not recall specific exposure to poison ivy however.  He has indicated no other complaints otherwise.      Patient History   No past medical history on file.  No past surgical history on file.  No family history on file.  Social History     Tobacco Use    Smoking status: Not on file    Smokeless tobacco: Not on file   Substance Use Topics    Alcohol use: Not on file    Drug use: Not on file       Physical Exam   ED Triage Vitals [07/22/24 1353]   Temperature Heart Rate Respirations BP   36.8 °C (98.2 °F) 74 20 145/76      Pulse Ox Temp Source Heart Rate Source Patient Position   100 % Oral Monitor Sitting      BP Location FiO2 (%)     Right arm --       Physical Exam  PHYSICAL EXAMINATION    GENERAL APPEARANCE: Awake and alert.     VITAL SIGNS: As per the nurses' triage record.     HEENT: Normocephalic, atraumatic. Extraocular muscles are intact. Pupils equal round and reactive to light. Conjunctiva are pink. Negative scleral icterus. Mucous membranes are moist. Tongue in the midline. Pharynx was without erythema or exudates, uvula midline    NECK: Soft Nontender and supple, full gross ROM, no meningeal signs.    CHEST: Nontender to palpation. Clear to auscultation bilaterally. No rales, rhonchi, or wheezing.     HEART: S1, S2. Regular rate and rhythm. No murmurs, gallops or rubs.  Strong and equal pulses in the extremities.     ABDOMEN: Soft, nontender, nondistended, positive bowel sounds, no palpable masses.    MUSCULOSKELETAL: The calves are nontender to palpation.  Full gross active range of motion. Ambulating on own with no acute difficulties     NEUROLOGICAL: Awake, alert and oriented x 3. Power intact in the upper and lower extremities. Sensation is intact to light touch in the upper and lower extremities.     IMMUNOLOGICAL: No lymphatic streaking noted     DERM: Patient has read not significantly raised macular like rashing over the majority of the body, no involvement of the palms, no involvement of the oral mucosa.  No trouble swallowing or breathing.  No stridor stridor or drooling.    ED Course & MDM   Diagnoses as of 07/22/24 1405   Rash   Contact dermatitis, unspecified contact dermatitis type, unspecified trigger                       [place] Coma Scale Score: 15                        Medical Decision Making  Parts of this chart have been completed using voice recognition software. Please excuse any errors of transcription.  My thought process and reason for plan has been formulated from the time that I saw the patient until the time of disposition and is not specific to one specific moment during their visit and furthermore my MDM encompasses this entire chart and not only this text box.      HPI: Detailed above.    Exam: A medically appropriate exam performed, outlined above, given the known history and presentation.    History obtained from: pt      Social Determinants of Health considered during this visit: lives at home      Medications given during visit: steriod and pepcid        Considerations/further MDM:  The patient is suggesting poison ivy, however is not vesicular and to me does not appear to be consistent with poison ivy.  However he says that it is very itchy, I question as to whether or not it may be a reaction to the clindamycin that was given last week.  However either way patient will be treated with increased dose of steroid, some Pepcid, over-the-counter Benadryl as needed.  Return precautions follow-up instructions discussed, he says that the  last time he felt like this happened he was given a shot of steroids and it helped a lot, he is requesting the shot of Solu-Medrol.  I do not have an issue with this.  Return precautions follow-up instructions discussed.  No signs of anaphylaxis, no sign of airway compromise, no sign of clinical dehydration, no signs of Jacobo-Antoine's or significant systemic reaction.    Procedure  Procedures     Federico Ramachandran PA-C  07/22/24 6542

## 2024-10-31 ENCOUNTER — HOSPITAL ENCOUNTER (EMERGENCY)
Facility: HOSPITAL | Age: 34
Discharge: HOME | End: 2024-10-31
Payer: COMMERCIAL

## 2024-10-31 VITALS
TEMPERATURE: 98.8 F | HEIGHT: 78 IN | SYSTOLIC BLOOD PRESSURE: 190 MMHG | WEIGHT: 279.54 LBS | RESPIRATION RATE: 16 BRPM | HEART RATE: 48 BPM | OXYGEN SATURATION: 100 % | BODY MASS INDEX: 32.34 KG/M2 | DIASTOLIC BLOOD PRESSURE: 101 MMHG

## 2024-10-31 DIAGNOSIS — L03.211 CELLULITIS OF FACE: Primary | ICD-10-CM

## 2024-10-31 PROCEDURE — 99283 EMERGENCY DEPT VISIT LOW MDM: CPT

## 2024-10-31 RX ORDER — CLINDAMYCIN HYDROCHLORIDE 150 MG/1
450 CAPSULE ORAL 3 TIMES DAILY
Qty: 63 CAPSULE | Refills: 0 | Status: SHIPPED | OUTPATIENT
Start: 2024-10-31 | End: 2024-11-07

## 2024-10-31 RX ORDER — IBUPROFEN 800 MG/1
800 TABLET ORAL 3 TIMES DAILY
Qty: 21 TABLET | Refills: 0 | Status: SHIPPED | OUTPATIENT
Start: 2024-10-31 | End: 2024-11-07

## 2024-10-31 RX ORDER — OXYCODONE AND ACETAMINOPHEN 5; 325 MG/1; MG/1
1 TABLET ORAL EVERY 6 HOURS PRN
Qty: 12 TABLET | Refills: 0 | Status: SHIPPED | OUTPATIENT
Start: 2024-10-31 | End: 2024-11-03

## 2024-10-31 ASSESSMENT — PAIN - FUNCTIONAL ASSESSMENT: PAIN_FUNCTIONAL_ASSESSMENT: 0-10

## 2024-10-31 ASSESSMENT — PAIN SCALES - GENERAL: PAINLEVEL_OUTOF10: 10 - WORST POSSIBLE PAIN

## 2025-02-24 ENCOUNTER — APPOINTMENT (OUTPATIENT)
Dept: RADIOLOGY | Facility: HOSPITAL | Age: 35
End: 2025-02-24
Payer: MEDICARE

## 2025-02-24 ENCOUNTER — HOSPITAL ENCOUNTER (EMERGENCY)
Facility: HOSPITAL | Age: 35
Discharge: HOME | End: 2025-02-24
Attending: STUDENT IN AN ORGANIZED HEALTH CARE EDUCATION/TRAINING PROGRAM
Payer: MEDICARE

## 2025-02-24 VITALS
TEMPERATURE: 97.3 F | HEART RATE: 60 BPM | SYSTOLIC BLOOD PRESSURE: 148 MMHG | HEIGHT: 78 IN | RESPIRATION RATE: 16 BRPM | OXYGEN SATURATION: 95 % | DIASTOLIC BLOOD PRESSURE: 85 MMHG | WEIGHT: 295.86 LBS | BODY MASS INDEX: 34.23 KG/M2

## 2025-02-24 DIAGNOSIS — S92.251A CLOSED AVULSION FRACTURE OF NAVICULAR BONE OF RIGHT FOOT, INITIAL ENCOUNTER: ICD-10-CM

## 2025-02-24 DIAGNOSIS — S82.402A CLOSED FRACTURE OF SHAFT OF LEFT FIBULA, UNSPECIFIED FRACTURE MORPHOLOGY, INITIAL ENCOUNTER: ICD-10-CM
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
## 2025-02-24 LAB
ALBUMIN SERPL BCP-MCNC: 4.1 G/DL (ref 3.4–5)
ALP SERPL-CCNC: 48 U/L (ref 33–120)
ALT SERPL W P-5'-P-CCNC: 28 U/L (ref 10–52)
ANION GAP SERPL CALCULATED.3IONS-SCNC: 11 MMOL/L (ref 10–20)
AST SERPL W P-5'-P-CCNC: 28 U/L (ref 9–39)
BASOPHILS # BLD AUTO: 0.07 X10*3/UL (ref 0–0.1)
BASOPHILS NFR BLD AUTO: 0.9 %
BILIRUB SERPL-MCNC: 0.4 MG/DL (ref 0–1.2)
BUN SERPL-MCNC: 23 MG/DL (ref 6–23)
CALCIUM SERPL-MCNC: 8.9 MG/DL (ref 8.6–10.3)
CHLORIDE SERPL-SCNC: 104 MMOL/L (ref 98–107)
CO2 SERPL-SCNC: 24 MMOL/L (ref 21–32)
CREAT SERPL-MCNC: 1.04 MG/DL (ref 0.5–1.3)
EGFRCR SERPLBLD CKD-EPI 2021: >90 ML/MIN/1.73M*2
EOSINOPHIL # BLD AUTO: 0.26 X10*3/UL (ref 0–0.7)
EOSINOPHIL NFR BLD AUTO: 3.4 %
ERYTHROCYTE [DISTWIDTH] IN BLOOD BY AUTOMATED COUNT: 13.2 % (ref 11.5–14.5)
GLUCOSE SERPL-MCNC: 101 MG/DL (ref 74–99)
HCT VFR BLD AUTO: 38.2 % (ref 41–52)
HGB BLD-MCNC: 13 G/DL (ref 13.5–17.5)
IMM GRANULOCYTES # BLD AUTO: 0.01 X10*3/UL (ref 0–0.7)
IMM GRANULOCYTES NFR BLD AUTO: 0.1 % (ref 0–0.9)
LYMPHOCYTES # BLD AUTO: 2.56 X10*3/UL (ref 1.2–4.8)
LYMPHOCYTES NFR BLD AUTO: 33.2 %
MCH RBC QN AUTO: 29.4 PG (ref 26–34)
MCHC RBC AUTO-ENTMCNC: 34 G/DL (ref 32–36)
MCV RBC AUTO: 86 FL (ref 80–100)
MONOCYTES # BLD AUTO: 0.72 X10*3/UL (ref 0.1–1)
MONOCYTES NFR BLD AUTO: 9.4 %
NEUTROPHILS # BLD AUTO: 4.08 X10*3/UL (ref 1.2–7.7)
NEUTROPHILS NFR BLD AUTO: 53 %
NRBC BLD-RTO: 0 /100 WBCS (ref 0–0)
PLATELET # BLD AUTO: 259 X10*3/UL (ref 150–450)
POTASSIUM SERPL-SCNC: 3.8 MMOL/L (ref 3.5–5.3)
PROT SERPL-MCNC: 7.1 G/DL (ref 6.4–8.2)
RBC # BLD AUTO: 4.42 X10*6/UL (ref 4.5–5.9)
SODIUM SERPL-SCNC: 135 MMOL/L (ref 136–145)
WBC # BLD AUTO: 7.7 X10*3/UL (ref 4.4–11.3)

## 2025-02-24 PROCEDURE — 73590 X-RAY EXAM OF LOWER LEG: CPT | Mod: RT

## 2025-02-24 PROCEDURE — 71260 CT THORAX DX C+: CPT | Performed by: SURGERY

## 2025-02-24 PROCEDURE — 73630 X-RAY EXAM OF FOOT: CPT | Mod: RT

## 2025-02-24 PROCEDURE — 72125 CT NECK SPINE W/O DYE: CPT

## 2025-02-24 PROCEDURE — 96374 THER/PROPH/DIAG INJ IV PUSH: CPT | Mod: 59

## 2025-02-24 PROCEDURE — 71260 CT THORAX DX C+: CPT

## 2025-02-24 PROCEDURE — 73590 X-RAY EXAM OF LOWER LEG: CPT | Mod: LEFT SIDE | Performed by: SURGERY

## 2025-02-24 PROCEDURE — 2500000001 HC RX 250 WO HCPCS SELF ADMINISTERED DRUGS (ALT 637 FOR MEDICARE OP): Performed by: STUDENT IN AN ORGANIZED HEALTH CARE EDUCATION/TRAINING PROGRAM

## 2025-02-24 PROCEDURE — 80053 COMPREHEN METABOLIC PANEL: CPT | Performed by: STUDENT IN AN ORGANIZED HEALTH CARE EDUCATION/TRAINING PROGRAM

## 2025-02-24 PROCEDURE — 70450 CT HEAD/BRAIN W/O DYE: CPT

## 2025-02-24 PROCEDURE — 2550000001 HC RX 255 CONTRASTS: Performed by: STUDENT IN AN ORGANIZED HEALTH CARE EDUCATION/TRAINING PROGRAM

## 2025-02-24 PROCEDURE — 71045 X-RAY EXAM CHEST 1 VIEW: CPT | Performed by: SURGERY

## 2025-02-24 PROCEDURE — 74177 CT ABD & PELVIS W/CONTRAST: CPT | Performed by: SURGERY

## 2025-02-24 PROCEDURE — 73564 X-RAY EXAM KNEE 4 OR MORE: CPT | Mod: LT

## 2025-02-24 PROCEDURE — 71045 X-RAY EXAM CHEST 1 VIEW: CPT

## 2025-02-24 PROCEDURE — 85025 COMPLETE CBC W/AUTO DIFF WBC: CPT | Performed by: STUDENT IN AN ORGANIZED HEALTH CARE EDUCATION/TRAINING PROGRAM

## 2025-02-24 PROCEDURE — 73030 X-RAY EXAM OF SHOULDER: CPT | Mod: LT

## 2025-02-24 PROCEDURE — 2500000004 HC RX 250 GENERAL PHARMACY W/ HCPCS (ALT 636 FOR OP/ED): Performed by: STUDENT IN AN ORGANIZED HEALTH CARE EDUCATION/TRAINING PROGRAM

## 2025-02-24 PROCEDURE — 74177 CT ABD & PELVIS W/CONTRAST: CPT

## 2025-02-24 PROCEDURE — 73590 X-RAY EXAM OF LOWER LEG: CPT | Mod: RIGHT SIDE | Performed by: SURGERY

## 2025-02-24 PROCEDURE — 73030 X-RAY EXAM OF SHOULDER: CPT | Mod: LEFT SIDE | Performed by: SURGERY

## 2025-02-24 PROCEDURE — 72125 CT NECK SPINE W/O DYE: CPT | Performed by: SURGERY

## 2025-02-24 PROCEDURE — 73590 X-RAY EXAM OF LOWER LEG: CPT | Mod: LT

## 2025-02-24 PROCEDURE — 36415 COLL VENOUS BLD VENIPUNCTURE: CPT | Performed by: STUDENT IN AN ORGANIZED HEALTH CARE EDUCATION/TRAINING PROGRAM

## 2025-02-24 PROCEDURE — 70450 CT HEAD/BRAIN W/O DYE: CPT | Performed by: SURGERY

## 2025-02-24 PROCEDURE — 99285 EMERGENCY DEPT VISIT HI MDM: CPT | Mod: 25 | Performed by: STUDENT IN AN ORGANIZED HEALTH CARE EDUCATION/TRAINING PROGRAM

## 2025-02-24 PROCEDURE — 73564 X-RAY EXAM KNEE 4 OR MORE: CPT | Mod: LEFT SIDE | Performed by: SURGERY

## 2025-02-24 PROCEDURE — 73630 X-RAY EXAM OF FOOT: CPT | Mod: RIGHT SIDE | Performed by: RADIOLOGY

## 2025-02-24 PROCEDURE — G0390 TRAUMA RESPONS W/HOSP CRITI: HCPCS

## 2025-02-24 RX ORDER — IBUPROFEN 600 MG/1
600 TABLET ORAL ONCE
Status: COMPLETED | OUTPATIENT
Start: 2025-02-24 | End: 2025-02-24

## 2025-02-24 RX ORDER — ONDANSETRON HYDROCHLORIDE 2 MG/ML
4 INJECTION, SOLUTION INTRAVENOUS ONCE
Status: COMPLETED | OUTPATIENT
Start: 2025-02-24 | End: 2025-02-24

## 2025-02-24 RX ORDER — ACETAMINOPHEN 325 MG/1
650 TABLET ORAL ONCE
Status: COMPLETED | OUTPATIENT
Start: 2025-02-24 | End: 2025-02-24

## 2025-02-24 RX ADMIN — ACETAMINOPHEN 650 MG: 325 TABLET, FILM COATED ORAL at 00:14

## 2025-02-24 RX ADMIN — IBUPROFEN 600 MG: 600 TABLET ORAL at 03:45

## 2025-02-24 RX ADMIN — IOHEXOL 75 ML: 350 INJECTION, SOLUTION INTRAVENOUS at 00:55

## 2025-02-24 RX ADMIN — ONDANSETRON 4 MG: 2 INJECTION INTRAMUSCULAR; INTRAVENOUS at 00:15

## 2025-02-24 ASSESSMENT — COLUMBIA-SUICIDE SEVERITY RATING SCALE - C-SSRS
2. HAVE YOU ACTUALLY HAD ANY THOUGHTS OF KILLING YOURSELF?: NO
2. HAVE YOU ACTUALLY HAD ANY THOUGHTS OF KILLING YOURSELF?: NO
1. IN THE PAST MONTH, HAVE YOU WISHED YOU WERE DEAD OR WISHED YOU COULD GO TO SLEEP AND NOT WAKE UP?: NO
6. HAVE YOU EVER DONE ANYTHING, STARTED TO DO ANYTHING, OR PREPARED TO DO ANYTHING TO END YOUR LIFE?: NO
1. IN THE PAST MONTH, HAVE YOU WISHED YOU WERE DEAD OR WISHED YOU COULD GO TO SLEEP AND NOT WAKE UP?: NO
6. HAVE YOU EVER DONE ANYTHING, STARTED TO DO ANYTHING, OR PREPARED TO DO ANYTHING TO END YOUR LIFE?: NO

## 2025-02-24 ASSESSMENT — PAIN DESCRIPTION - DESCRIPTORS: DESCRIPTORS: POUNDING

## 2025-02-24 ASSESSMENT — PAIN SCALES - GENERAL: PAINLEVEL_OUTOF10: 8

## 2025-02-24 ASSESSMENT — PAIN - FUNCTIONAL ASSESSMENT: PAIN_FUNCTIONAL_ASSESSMENT: 0-10
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
## 2025-02-24 NOTE — DISCHARGE INSTRUCTIONS
Your x-rays revealed a fibula fracture of the left leg and a navicular avulsion fracture of the right leg.  You may bear weight as tolerated.  For your left leg, you should obtain a walking boot.  You should wear the walking boot whenever you are putting weight on your leg.  For your right leg, you should use an Ace wrap on your ankle and obtain a surgical shoe.  You should follow-up with foot and ankle specialist listed above.  You may use Tylenol and ibuprofen as needed.    It is important to remember that your care does not end here and you must continue to monitor your condition closely. Please return to the emergency department for any worsening or concerning signs or symptoms as directed by our conversations and the discharge instructions. If you do not have a doctor please contact the referral number on your discharge instructions. Please contact any physician specialists provided in your discharge notes as it is very important to follow up with them regarding your condition. If you are unable to reach the physicians provided, please come back to the Emergency Department at any time.    Return to emergency room without delay for ANY new or worsening pains or for any other symptoms or concerns.  Return with worsening pains, nausea, vomiting, trouble breathing, palpitations, shortness of breath, inability to pass stool or urine, loss of control of stool or urine, any numbness or tingling (that is not normal for you), uncontrolled fevers, the passing of blood or other material in stool or urine, rashes, pains or for any other symptoms or concerns you may have.  You are always welcome to return to the ER at any time for any reason or for any other concerns you may have.